# Patient Record
Sex: MALE | Race: WHITE | HISPANIC OR LATINO | URBAN - METROPOLITAN AREA
[De-identification: names, ages, dates, MRNs, and addresses within clinical notes are randomized per-mention and may not be internally consistent; named-entity substitution may affect disease eponyms.]

---

## 2016-05-25 RX ORDER — BICALUTAMIDE 50 MG/1
1 TABLET, FILM COATED ORAL
Qty: 30 | Refills: 0 | COMMUNITY
Start: 2016-05-25 | End: 2016-06-24

## 2017-02-05 ENCOUNTER — EMERGENCY (EMERGENCY)
Facility: HOSPITAL | Age: 82
LOS: 1 days | Discharge: HOME | End: 2017-02-05
Attending: EMERGENCY MEDICINE
Payer: MEDICARE

## 2017-02-05 VITALS
RESPIRATION RATE: 18 BRPM | HEART RATE: 62 BPM | DIASTOLIC BLOOD PRESSURE: 62 MMHG | TEMPERATURE: 98 F | SYSTOLIC BLOOD PRESSURE: 150 MMHG | OXYGEN SATURATION: 99 %

## 2017-02-05 VITALS
TEMPERATURE: 98 F | DIASTOLIC BLOOD PRESSURE: 101 MMHG | HEART RATE: 76 BPM | HEIGHT: 67 IN | RESPIRATION RATE: 19 BRPM | WEIGHT: 153 LBS | SYSTOLIC BLOOD PRESSURE: 185 MMHG | OXYGEN SATURATION: 95 %

## 2017-02-05 DIAGNOSIS — K21.9 GASTRO-ESOPHAGEAL REFLUX DISEASE WITHOUT ESOPHAGITIS: ICD-10-CM

## 2017-02-05 DIAGNOSIS — Z98.41 CATARACT EXTRACTION STATUS, RIGHT EYE: ICD-10-CM

## 2017-02-05 DIAGNOSIS — M79.604 PAIN IN RIGHT LEG: ICD-10-CM

## 2017-02-05 DIAGNOSIS — C79.51 SECONDARY MALIGNANT NEOPLASM OF BONE: ICD-10-CM

## 2017-02-05 DIAGNOSIS — Z88.0 ALLERGY STATUS TO PENICILLIN: ICD-10-CM

## 2017-02-05 DIAGNOSIS — Z96.1 PRESENCE OF INTRAOCULAR LENS: ICD-10-CM

## 2017-02-05 DIAGNOSIS — Z98.42 CATARACT EXTRACTION STATUS, LEFT EYE: ICD-10-CM

## 2017-02-05 DIAGNOSIS — Z92.21 PERSONAL HISTORY OF ANTINEOPLASTIC CHEMOTHERAPY: ICD-10-CM

## 2017-02-05 DIAGNOSIS — Z98.41 CATARACT EXTRACTION STATUS, RIGHT EYE: Chronic | ICD-10-CM

## 2017-02-05 DIAGNOSIS — C61 MALIGNANT NEOPLASM OF PROSTATE: ICD-10-CM

## 2017-02-05 DIAGNOSIS — M79.605 PAIN IN LEFT LEG: ICD-10-CM

## 2017-02-05 LAB
ALBUMIN SERPL ELPH-MCNC: 4 G/DL — SIGNIFICANT CHANGE UP (ref 3.5–5)
ALP SERPL-CCNC: 910 U/L — HIGH (ref 40–120)
ALT FLD-CCNC: 16 U/L DA — SIGNIFICANT CHANGE UP (ref 10–60)
ANION GAP SERPL CALC-SCNC: 9 MMOL/L — SIGNIFICANT CHANGE UP (ref 5–17)
APPEARANCE UR: CLEAR — SIGNIFICANT CHANGE UP
APTT BLD: 34.1 SEC — SIGNIFICANT CHANGE UP (ref 27.5–37.4)
AST SERPL-CCNC: 43 U/L — HIGH (ref 10–40)
BACTERIA # UR AUTO: ABNORMAL /HPF
BASOPHILS # BLD AUTO: 0 K/UL — SIGNIFICANT CHANGE UP (ref 0–0.2)
BASOPHILS NFR BLD AUTO: 0.6 % — SIGNIFICANT CHANGE UP (ref 0–2)
BILIRUB SERPL-MCNC: 0.5 MG/DL — SIGNIFICANT CHANGE UP (ref 0.2–1.2)
BILIRUB UR-MCNC: NEGATIVE — SIGNIFICANT CHANGE UP
BUN SERPL-MCNC: 17 MG/DL — SIGNIFICANT CHANGE UP (ref 7–18)
CALCIUM SERPL-MCNC: 8.5 MG/DL — SIGNIFICANT CHANGE UP (ref 8.4–10.5)
CHLORIDE SERPL-SCNC: 104 MMOL/L — SIGNIFICANT CHANGE UP (ref 96–108)
CK SERPL-CCNC: 270 U/L — HIGH (ref 35–232)
CO2 SERPL-SCNC: 28 MMOL/L — SIGNIFICANT CHANGE UP (ref 22–31)
COLOR SPEC: YELLOW — SIGNIFICANT CHANGE UP
CREAT SERPL-MCNC: 0.92 MG/DL — SIGNIFICANT CHANGE UP (ref 0.5–1.3)
DIFF PNL FLD: ABNORMAL
EOSINOPHIL # BLD AUTO: 0 K/UL — SIGNIFICANT CHANGE UP (ref 0–0.5)
EOSINOPHIL NFR BLD AUTO: 0.3 % — SIGNIFICANT CHANGE UP (ref 0–6)
EPI CELLS # UR: ABNORMAL (ref 0–10)
GLUCOSE SERPL-MCNC: 104 MG/DL — HIGH (ref 70–99)
GLUCOSE UR QL: NEGATIVE — SIGNIFICANT CHANGE UP
HCT VFR BLD CALC: 34.3 % — LOW (ref 39–50)
HGB BLD-MCNC: 11 G/DL — LOW (ref 13–17)
INR BLD: 1.22 RATIO — HIGH (ref 0.88–1.16)
KETONES UR-MCNC: NEGATIVE — SIGNIFICANT CHANGE UP
LEUKOCYTE ESTERASE UR-ACNC: NEGATIVE — SIGNIFICANT CHANGE UP
LYMPHOCYTES # BLD AUTO: 0.8 K/UL — LOW (ref 1–3.3)
LYMPHOCYTES # BLD AUTO: 20.4 % — SIGNIFICANT CHANGE UP (ref 13–44)
MCHC RBC-ENTMCNC: 30.2 PG — SIGNIFICANT CHANGE UP (ref 27–34)
MCHC RBC-ENTMCNC: 32 GM/DL — SIGNIFICANT CHANGE UP (ref 32–36)
MCV RBC AUTO: 94.1 FL — SIGNIFICANT CHANGE UP (ref 80–100)
MONOCYTES # BLD AUTO: 0.4 K/UL — SIGNIFICANT CHANGE UP (ref 0–0.9)
MONOCYTES NFR BLD AUTO: 9.6 % — SIGNIFICANT CHANGE UP (ref 2–14)
NEUTROPHILS # BLD AUTO: 2.6 K/UL — SIGNIFICANT CHANGE UP (ref 1.8–7.4)
NEUTROPHILS NFR BLD AUTO: 69.1 % — SIGNIFICANT CHANGE UP (ref 43–77)
NITRITE UR-MCNC: NEGATIVE — SIGNIFICANT CHANGE UP
PH UR: 7 — SIGNIFICANT CHANGE UP (ref 4.8–8)
PLATELET # BLD AUTO: 87 K/UL — LOW (ref 150–400)
POTASSIUM SERPL-MCNC: 4.3 MMOL/L — SIGNIFICANT CHANGE UP (ref 3.5–5.3)
POTASSIUM SERPL-SCNC: 4.3 MMOL/L — SIGNIFICANT CHANGE UP (ref 3.5–5.3)
PROT SERPL-MCNC: 7.1 G/DL — SIGNIFICANT CHANGE UP (ref 6–8.3)
PROT UR-MCNC: 30 MG/DL
PROTHROM AB SERPL-ACNC: 13.7 SEC — HIGH (ref 10–13.1)
RBC # BLD: 3.64 M/UL — LOW (ref 4.2–5.8)
RBC # FLD: 14 % — SIGNIFICANT CHANGE UP (ref 10.3–14.5)
RBC CASTS # UR COMP ASSIST: SIGNIFICANT CHANGE UP /HPF (ref 0–2)
SODIUM SERPL-SCNC: 141 MMOL/L — SIGNIFICANT CHANGE UP (ref 135–145)
SP GR SPEC: 1.01 — SIGNIFICANT CHANGE UP (ref 1.01–1.02)
UROBILINOGEN FLD QL: NEGATIVE — SIGNIFICANT CHANGE UP
WBC # BLD: 3.8 K/UL — SIGNIFICANT CHANGE UP (ref 3.8–10.5)
WBC # FLD AUTO: 3.8 K/UL — SIGNIFICANT CHANGE UP (ref 3.8–10.5)
WBC UR QL: SIGNIFICANT CHANGE UP /HPF (ref 0–5)

## 2017-02-05 PROCEDURE — 99285 EMERGENCY DEPT VISIT HI MDM: CPT

## 2017-02-05 PROCEDURE — 73521 X-RAY EXAM HIPS BI 2 VIEWS: CPT | Mod: 26

## 2017-02-05 PROCEDURE — 72131 CT LUMBAR SPINE W/O DYE: CPT | Mod: 26

## 2017-02-05 PROCEDURE — 82550 ASSAY OF CK (CPK): CPT

## 2017-02-05 PROCEDURE — 72131 CT LUMBAR SPINE W/O DYE: CPT

## 2017-02-05 PROCEDURE — 99284 EMERGENCY DEPT VISIT MOD MDM: CPT | Mod: 25

## 2017-02-05 PROCEDURE — 93970 EXTREMITY STUDY: CPT | Mod: 26

## 2017-02-05 PROCEDURE — 85027 COMPLETE CBC AUTOMATED: CPT

## 2017-02-05 PROCEDURE — 85610 PROTHROMBIN TIME: CPT

## 2017-02-05 PROCEDURE — 71010: CPT | Mod: 26

## 2017-02-05 PROCEDURE — 87086 URINE CULTURE/COLONY COUNT: CPT

## 2017-02-05 PROCEDURE — 71045 X-RAY EXAM CHEST 1 VIEW: CPT

## 2017-02-05 PROCEDURE — 81001 URINALYSIS AUTO W/SCOPE: CPT

## 2017-02-05 PROCEDURE — 80053 COMPREHEN METABOLIC PANEL: CPT

## 2017-02-05 PROCEDURE — 85730 THROMBOPLASTIN TIME PARTIAL: CPT

## 2017-02-05 PROCEDURE — 93970 EXTREMITY STUDY: CPT

## 2017-02-05 PROCEDURE — 73521 X-RAY EXAM HIPS BI 2 VIEWS: CPT

## 2017-02-05 RX ORDER — SODIUM CHLORIDE 9 MG/ML
1000 INJECTION INTRAMUSCULAR; INTRAVENOUS; SUBCUTANEOUS
Qty: 0 | Refills: 0 | Status: DISCONTINUED | OUTPATIENT
Start: 2017-02-05 | End: 2017-02-09

## 2017-02-05 RX ORDER — ACETAMINOPHEN 500 MG
650 TABLET ORAL ONCE
Qty: 0 | Refills: 0 | Status: COMPLETED | OUTPATIENT
Start: 2017-02-05 | End: 2017-02-05

## 2017-02-05 RX ADMIN — Medication 650 MILLIGRAM(S): at 10:28

## 2017-02-05 RX ADMIN — SODIUM CHLORIDE 125 MILLILITER(S): 9 INJECTION INTRAMUSCULAR; INTRAVENOUS; SUBCUTANEOUS at 10:28

## 2017-02-05 NOTE — ED PROVIDER NOTE - MUSCULOSKELETAL, MLM
Spine appears kyphotic, range of motion is not limited, no muscle or joint tenderness, no calf tenderness, no CVA tenderness, straight leg 90 degree

## 2017-02-05 NOTE — ED PROVIDER NOTE - OBJECTIVE STATEMENT
89 y/o M pt w/ PMHx of prostate cancer diagnosed May 2016, s/p chemo therapy presents to the ED c/o b/l anterior upper thigh pain since yesterday. Associates weakness secondary to symptoms/ Pt denies injuries, difficulty ambulating, back pain, edema, calf pains, fever, N/V/D, dysuria, coughing or any other complaints. NKDA.

## 2017-02-05 NOTE — ED PROVIDER NOTE - NS ED MD SCRIBE ATTENDING SCRIBE SECTIONS
VITAL SIGNS( Pullset)/HIV/DISPOSITION/PHYSICAL EXAM/HISTORY OF PRESENT ILLNESS/REVIEW OF SYSTEMS/PAST MEDICAL/SURGICAL/SOCIAL HISTORY

## 2017-02-05 NOTE — ED ADULT NURSE NOTE - PMH
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

## 2017-02-05 NOTE — ED PROVIDER NOTE - MEDICAL DECISION MAKING DETAILS
Pt w/ prostate CA now w/ b/l thigh pain. Concern for metastasis. Will get labs, CT, LS spine and hip X-ray.

## 2017-02-05 NOTE — ED ADULT NURSE NOTE - OBJECTIVE STATEMENT
Presented to ED with complaints of bilateral leg pain that started yesterday. States he "was not able to sleep last night." Patient with history of prostate cancer. AA&Ox3. Breathing on room air. Able to move all extremities.

## 2017-02-06 LAB
CULTURE RESULTS: NO GROWTH — SIGNIFICANT CHANGE UP
SPECIMEN SOURCE: SIGNIFICANT CHANGE UP

## 2017-06-11 RX ORDER — BICALUTAMIDE 50 MG/1
0 TABLET, FILM COATED ORAL
Qty: 90 | Refills: 0 | COMMUNITY
Start: 2017-06-11

## 2017-06-21 ENCOUNTER — INPATIENT (INPATIENT)
Facility: HOSPITAL | Age: 82
LOS: 0 days | Discharge: HOME | DRG: 812 | End: 2017-06-22
Attending: INTERNAL MEDICINE | Admitting: INTERNAL MEDICINE
Payer: COMMERCIAL

## 2017-06-21 VITALS
HEART RATE: 100 BPM | DIASTOLIC BLOOD PRESSURE: 61 MMHG | OXYGEN SATURATION: 98 % | SYSTOLIC BLOOD PRESSURE: 138 MMHG | TEMPERATURE: 98 F | HEIGHT: 67 IN | RESPIRATION RATE: 18 BRPM | WEIGHT: 145.06 LBS

## 2017-06-21 DIAGNOSIS — D64.9 ANEMIA, UNSPECIFIED: ICD-10-CM

## 2017-06-21 DIAGNOSIS — Z29.9 ENCOUNTER FOR PROPHYLACTIC MEASURES, UNSPECIFIED: ICD-10-CM

## 2017-06-21 DIAGNOSIS — Z98.41 CATARACT EXTRACTION STATUS, RIGHT EYE: Chronic | ICD-10-CM

## 2017-06-21 DIAGNOSIS — C61 MALIGNANT NEOPLASM OF PROSTATE: ICD-10-CM

## 2017-06-21 DIAGNOSIS — I48.91 UNSPECIFIED ATRIAL FIBRILLATION: ICD-10-CM

## 2017-06-21 LAB
ALBUMIN SERPL ELPH-MCNC: 3.1 G/DL — LOW (ref 3.5–5)
ALP SERPL-CCNC: 983 U/L — HIGH (ref 40–120)
ALT FLD-CCNC: 17 U/L DA — SIGNIFICANT CHANGE UP (ref 10–60)
ANION GAP SERPL CALC-SCNC: 8 MMOL/L — SIGNIFICANT CHANGE UP (ref 5–17)
APTT BLD: 28.6 SEC — SIGNIFICANT CHANGE UP (ref 27.5–37.4)
AST SERPL-CCNC: 54 U/L — HIGH (ref 10–40)
BASOPHILS # BLD AUTO: 0 K/UL — SIGNIFICANT CHANGE UP (ref 0–0.2)
BASOPHILS NFR BLD AUTO: 1.2 % — SIGNIFICANT CHANGE UP (ref 0–2)
BILIRUB SERPL-MCNC: 0.3 MG/DL — SIGNIFICANT CHANGE UP (ref 0.2–1.2)
BUN SERPL-MCNC: 24 MG/DL — HIGH (ref 7–18)
CALCIUM SERPL-MCNC: 8.1 MG/DL — LOW (ref 8.4–10.5)
CHLORIDE SERPL-SCNC: 106 MMOL/L — SIGNIFICANT CHANGE UP (ref 96–108)
CO2 SERPL-SCNC: 26 MMOL/L — SIGNIFICANT CHANGE UP (ref 22–31)
CREAT SERPL-MCNC: 1.13 MG/DL — SIGNIFICANT CHANGE UP (ref 0.5–1.3)
EOSINOPHIL # BLD AUTO: 0 K/UL — SIGNIFICANT CHANGE UP (ref 0–0.5)
EOSINOPHIL NFR BLD AUTO: 0.3 % — SIGNIFICANT CHANGE UP (ref 0–6)
GLUCOSE SERPL-MCNC: 128 MG/DL — HIGH (ref 70–99)
HCT VFR BLD CALC: 21.1 % — LOW (ref 39–50)
HGB BLD-MCNC: 6.8 G/DL — CRITICAL LOW (ref 13–17)
INR BLD: 1.21 RATIO — HIGH (ref 0.88–1.16)
LYMPHOCYTES # BLD AUTO: 0.6 K/UL — LOW (ref 1–3.3)
LYMPHOCYTES # BLD AUTO: 21.8 % — SIGNIFICANT CHANGE UP (ref 13–44)
MCHC RBC-ENTMCNC: 30.3 PG — SIGNIFICANT CHANGE UP (ref 27–34)
MCHC RBC-ENTMCNC: 32.5 GM/DL — SIGNIFICANT CHANGE UP (ref 32–36)
MCV RBC AUTO: 93.3 FL — SIGNIFICANT CHANGE UP (ref 80–100)
MONOCYTES # BLD AUTO: 0.3 K/UL — SIGNIFICANT CHANGE UP (ref 0–0.9)
MONOCYTES NFR BLD AUTO: 8.9 % — SIGNIFICANT CHANGE UP (ref 2–14)
NEUTROPHILS # BLD AUTO: 2 K/UL — SIGNIFICANT CHANGE UP (ref 1.8–7.4)
NEUTROPHILS NFR BLD AUTO: 67.8 % — SIGNIFICANT CHANGE UP (ref 43–77)
PLATELET # BLD AUTO: 72 K/UL — LOW (ref 150–400)
POTASSIUM SERPL-MCNC: 4.1 MMOL/L — SIGNIFICANT CHANGE UP (ref 3.5–5.3)
POTASSIUM SERPL-SCNC: 4.1 MMOL/L — SIGNIFICANT CHANGE UP (ref 3.5–5.3)
PROT SERPL-MCNC: 6.4 G/DL — SIGNIFICANT CHANGE UP (ref 6–8.3)
PROTHROM AB SERPL-ACNC: 13.2 SEC — HIGH (ref 9.8–12.7)
RBC # BLD: 2.26 M/UL — LOW (ref 4.2–5.8)
RBC # FLD: 16.5 % — HIGH (ref 10.3–14.5)
SODIUM SERPL-SCNC: 140 MMOL/L — SIGNIFICANT CHANGE UP (ref 135–145)
WBC # BLD: 3 K/UL — LOW (ref 3.8–10.5)
WBC # FLD AUTO: 3 K/UL — LOW (ref 3.8–10.5)

## 2017-06-21 PROCEDURE — 99285 EMERGENCY DEPT VISIT HI MDM: CPT

## 2017-06-21 RX ORDER — SODIUM CHLORIDE 9 MG/ML
1000 INJECTION INTRAMUSCULAR; INTRAVENOUS; SUBCUTANEOUS
Qty: 0 | Refills: 0 | Status: DISCONTINUED | OUTPATIENT
Start: 2017-06-21 | End: 2017-06-22

## 2017-06-21 RX ORDER — SODIUM CHLORIDE 9 MG/ML
3 INJECTION INTRAMUSCULAR; INTRAVENOUS; SUBCUTANEOUS ONCE
Qty: 0 | Refills: 0 | Status: COMPLETED | OUTPATIENT
Start: 2017-06-21 | End: 2017-06-21

## 2017-06-21 RX ORDER — ACETAMINOPHEN 500 MG
650 TABLET ORAL ONCE
Qty: 0 | Refills: 0 | Status: COMPLETED | OUTPATIENT
Start: 2017-06-21 | End: 2017-06-21

## 2017-06-21 RX ORDER — FOLIC ACID 0.8 MG
1 TABLET ORAL DAILY
Qty: 0 | Refills: 0 | Status: DISCONTINUED | OUTPATIENT
Start: 2017-06-21 | End: 2017-06-22

## 2017-06-21 RX ORDER — METOPROLOL TARTRATE 50 MG
12.5 TABLET ORAL ONCE
Qty: 0 | Refills: 0 | Status: COMPLETED | OUTPATIENT
Start: 2017-06-21 | End: 2017-06-21

## 2017-06-21 RX ADMIN — SODIUM CHLORIDE 60 MILLILITER(S): 9 INJECTION INTRAMUSCULAR; INTRAVENOUS; SUBCUTANEOUS at 20:24

## 2017-06-21 RX ADMIN — Medication 650 MILLIGRAM(S): at 22:20

## 2017-06-21 RX ADMIN — Medication 1 MILLIGRAM(S): at 15:59

## 2017-06-21 RX ADMIN — Medication 1 TABLET(S): at 15:59

## 2017-06-21 RX ADMIN — SODIUM CHLORIDE 3 MILLILITER(S): 9 INJECTION INTRAMUSCULAR; INTRAVENOUS; SUBCUTANEOUS at 13:55

## 2017-06-21 RX ADMIN — SODIUM CHLORIDE 60 MILLILITER(S): 9 INJECTION INTRAMUSCULAR; INTRAVENOUS; SUBCUTANEOUS at 16:01

## 2017-06-21 NOTE — H&P ADULT - NEGATIVE SKIN SYMPTOMS
no hair loss/no rash/no pitted nails/no dryness/no itching/no tumor/no change in size/color of mole/no brittle nails

## 2017-06-21 NOTE — CHART NOTE - NSCHARTNOTEFT_GEN_A_CORE
Paged by RN regarding patient refusing 2 U PRBC/    Patient seen and examined at bedside. After speaking to nurse manager, patient now agreeing to 2nd unit PRBC.    ~. Refuses BP meds as says he has not needed pills at home and feels fine. Also refuses CXR.    Discussed with patient that in case his BP goes up 2/2 PRBC that we may have to give BP meds. Patient expressed understanding.    Will check CBC now (post 1st U PRBC) and then check CBC AM (post 2nd U PRBC).    Discussed plan with RN.

## 2017-06-21 NOTE — ED PROVIDER NOTE - MEDICAL DECISION MAKING DETAILS
89 y/o M pt w/ shortness of breath and weakness sent in by PMD for low HGB. Will draw labs, guaiac and admit. 89 y/o M pt w/ shortness of breath and weakness sent in by PMD for low HGB. Will draw labs, guaiac and admit to Dr. Navarro.

## 2017-06-21 NOTE — H&P ADULT - NEGATIVE CARDIOVASCULAR SYMPTOMS
no palpitations/no dyspnea on exertion/no chest pain/no orthopnea/no claudication/no peripheral edema

## 2017-06-21 NOTE — H&P ADULT - NEGATIVE NEUROLOGICAL SYMPTOMS
no transient paralysis/no difficulty walking/no vertigo/no headache/no loss of sensation/no tremors/no facial palsy/no focal seizures/no paresthesias/no confusion/no weakness/no loss of consciousness

## 2017-06-21 NOTE — H&P ADULT - HISTORY OF PRESENT ILLNESS
89 year old male from home ambulates with cane  no HHA independant in ADL .past medical history of metastatic prostate cancer with mets to the bone and liver  afib ( not on a/c or rate control meds)only on po chemothereapy taking  Bicalutamide TID  was sent in to the ER by PCP for low hemoglobin . patient states he was in his usual state of health 1 month ago , but now he is feeling increased lethary and weakness , he has PORTER and fatigue . his symptoms were progressivley getting worse , he went to see his PCP Dr Navarro yesterday where they did some blood test , today he was called that his hemoglobin was 7.0 , his last h/h one year ago was 10.0 . he also called his hemeoncologist Dr Bowman , who agreed with the plan of care about him going to the ER , patient denies in blood in stools , black stools , hematuria , abdominal pain or any other symptoms .Denies fever, chills or any other complaints. .ROS is negative except above.    In the ER patient's VS T 98.3 ,  , bp 138/61 , RR 18 PULSE OX of 98 , patient's labs pertient for h/h 6.8/21.1 , akp phos 983 , AST 54 .ekg afib (per pcp afib is old ). patient refused guiac . 2 units PRBC  were ordered.    GOC : patient wishes to be DNR/DNI  ( consent in chart)

## 2017-06-21 NOTE — H&P ADULT - NEGATIVE GENERAL GENITOURINARY SYMPTOMS
normal urinary frequency/no urinary hesitancy/no nocturia/normal libido/no urine discoloration/no gas in urine/no bladder infections/no hematuria

## 2017-06-21 NOTE — ED ADULT NURSE NOTE - OBJECTIVE STATEMENT
axox3 forgetful at times ambulates with assistance sent by PCP due to low H/H denies any discomfort at present time

## 2017-06-21 NOTE — H&P ADULT - NEGATIVE PSYCHIATRIC SYMPTOMS
no paranoia/no mood swings/no suicidal ideation/no auditory hallucinations/no memory loss/no depression/no anxiety/no agitation/no visual hallucinations/no hyperactivity

## 2017-06-21 NOTE — H&P ADULT - NEGATIVE ENMT SYMPTOMS
no tinnitus/no ear pain/no abnormal taste sensation/no dysphagia/no post-nasal discharge/no nose bleeds/no nasal discharge/no hearing difficulty/no throat pain/no sinus symptoms/no nasal obstruction/no gum bleeding/no recurrent cold sores/no dry mouth

## 2017-06-21 NOTE — H&P ADULT - PROBLEM SELECTOR PLAN 1
symptomatic anemia: h/h 6.8/21.1   baseline of 10.0 last year  patient vs stable  refused guiac  getting 2 units PRBC  likley side effect of Bicalutamide with super imposed malignancy   f/u guiac when patient has BM  Dr Colby red onc consult ( follows patient as outaptent)   cbc q12

## 2017-06-21 NOTE — H&P ADULT - NEGATIVE GASTROINTESTINAL SYMPTOMS
no vomiting/no melena/no steatorrhea/no hematochezia/no jaundice/no nausea/no change in bowel habits/no diarrhea/no flatulence/no abdominal pain

## 2017-06-21 NOTE — H&P ADULT - NEGATIVE OPHTHALMOLOGIC SYMPTOMS
no pain R/no diplopia/no discharge L/no photophobia/no lacrimation R/no lacrimation L/no irritation L

## 2017-06-21 NOTE — H&P ADULT - ASSESSMENT
89 year old male from home ambulates with cane  no HHA independant in ADL .past medical history of metastatic prostate cancer with mets to the bone and liver  afib ( not on a/c or rate control meds)only on po chemothereapy taking  Bicalutamide TID  was sent in to the ER by PCP for low hemoglobin  is being admitted for symptomatic anemia requiring blood transfusion.

## 2017-06-21 NOTE — ED PROVIDER NOTE - OBJECTIVE STATEMENT
89 y/o M pt w/ PMHx of GERD, metastatic prostate CA to the bone and cataract of L eye presents to the ED c/o low HGB. Pt felt weakness w/ shortness of breath; pt went to PMD who sent pt to the ED for low HGB. Denies fever, chills or any other complaints. NKDA. 89 y/o M pt w/ PMHx of GERD, metastatic prostate CA to the bone and cataract of L eye presents to the ED c/o low HGB. Pt felt weakness w/ shortness of breath; pt went to PMD who sent pt to the ED for low HGB. Denies fever, chills or any other complaints. NKDA. Pt on chemo medication Bicalutamide.

## 2017-06-22 VITALS
OXYGEN SATURATION: 98 % | SYSTOLIC BLOOD PRESSURE: 143 MMHG | DIASTOLIC BLOOD PRESSURE: 73 MMHG | RESPIRATION RATE: 16 BRPM | HEART RATE: 77 BPM | TEMPERATURE: 98 F

## 2017-06-22 LAB
24R-OH-CALCIDIOL SERPL-MCNC: 23.5 NG/ML — LOW (ref 30–100)
ALBUMIN SERPL ELPH-MCNC: 2.9 G/DL — LOW (ref 3.5–5)
ALP SERPL-CCNC: 939 U/L — HIGH (ref 40–120)
ALT FLD-CCNC: 17 U/L DA — SIGNIFICANT CHANGE UP (ref 10–60)
ANION GAP SERPL CALC-SCNC: 7 MMOL/L — SIGNIFICANT CHANGE UP (ref 5–17)
AST SERPL-CCNC: 51 U/L — HIGH (ref 10–40)
BASOPHILS # BLD AUTO: 0 K/UL — SIGNIFICANT CHANGE UP (ref 0–0.2)
BASOPHILS NFR BLD AUTO: 1.1 % — SIGNIFICANT CHANGE UP (ref 0–2)
BILIRUB SERPL-MCNC: 0.8 MG/DL — SIGNIFICANT CHANGE UP (ref 0.2–1.2)
BUN SERPL-MCNC: 19 MG/DL — HIGH (ref 7–18)
CALCIUM SERPL-MCNC: 8 MG/DL — LOW (ref 8.4–10.5)
CHLORIDE SERPL-SCNC: 106 MMOL/L — SIGNIFICANT CHANGE UP (ref 96–108)
CHOLEST SERPL-MCNC: 189 MG/DL — SIGNIFICANT CHANGE UP (ref 10–199)
CO2 SERPL-SCNC: 25 MMOL/L — SIGNIFICANT CHANGE UP (ref 22–31)
CREAT SERPL-MCNC: 0.96 MG/DL — SIGNIFICANT CHANGE UP (ref 0.5–1.3)
EOSINOPHIL # BLD AUTO: 0 K/UL — SIGNIFICANT CHANGE UP (ref 0–0.5)
EOSINOPHIL NFR BLD AUTO: 0.1 % — SIGNIFICANT CHANGE UP (ref 0–6)
FOLATE SERPL-MCNC: >20 NG/ML — SIGNIFICANT CHANGE UP (ref 4.8–24.2)
GLUCOSE SERPL-MCNC: 105 MG/DL — HIGH (ref 70–99)
HCT VFR BLD CALC: 24.2 % — LOW (ref 39–50)
HCT VFR BLD CALC: 27.2 % — LOW (ref 39–50)
HDLC SERPL-MCNC: 55 MG/DL — SIGNIFICANT CHANGE UP (ref 40–125)
HGB BLD-MCNC: 8.1 G/DL — LOW (ref 13–17)
HGB BLD-MCNC: 9.3 G/DL — LOW (ref 13–17)
LIPID PNL WITH DIRECT LDL SERPL: 119 MG/DL — SIGNIFICANT CHANGE UP
LYMPHOCYTES # BLD AUTO: 1 K/UL — SIGNIFICANT CHANGE UP (ref 1–3.3)
LYMPHOCYTES # BLD AUTO: 30.1 % — SIGNIFICANT CHANGE UP (ref 13–44)
LYMPHOCYTES # BLD AUTO: 34 % — SIGNIFICANT CHANGE UP (ref 13–44)
MAGNESIUM SERPL-MCNC: 2.2 MG/DL — SIGNIFICANT CHANGE UP (ref 1.6–2.6)
MCHC RBC-ENTMCNC: 31.2 PG — SIGNIFICANT CHANGE UP (ref 27–34)
MCHC RBC-ENTMCNC: 31.2 PG — SIGNIFICANT CHANGE UP (ref 27–34)
MCHC RBC-ENTMCNC: 33.3 GM/DL — SIGNIFICANT CHANGE UP (ref 32–36)
MCHC RBC-ENTMCNC: 34.4 GM/DL — SIGNIFICANT CHANGE UP (ref 32–36)
MCV RBC AUTO: 90.9 FL — SIGNIFICANT CHANGE UP (ref 80–100)
MCV RBC AUTO: 93.6 FL — SIGNIFICANT CHANGE UP (ref 80–100)
MONOCYTES # BLD AUTO: 0.3 K/UL — SIGNIFICANT CHANGE UP (ref 0–0.9)
MONOCYTES NFR BLD AUTO: 2 % — SIGNIFICANT CHANGE UP (ref 2–14)
MONOCYTES NFR BLD AUTO: 9.2 % — SIGNIFICANT CHANGE UP (ref 2–14)
NEUTROPHILS # BLD AUTO: 1.9 K/UL — SIGNIFICANT CHANGE UP (ref 1.8–7.4)
NEUTROPHILS NFR BLD AUTO: 59.5 % — SIGNIFICANT CHANGE UP (ref 43–77)
NEUTROPHILS NFR BLD AUTO: 62 % — SIGNIFICANT CHANGE UP (ref 43–77)
PHOSPHATE SERPL-MCNC: 2.8 MG/DL — SIGNIFICANT CHANGE UP (ref 2.5–4.5)
PLATELET # BLD AUTO: 50 K/UL — LOW (ref 150–400)
PLATELET # BLD AUTO: 55 K/UL — LOW (ref 150–400)
POTASSIUM SERPL-MCNC: 4.1 MMOL/L — SIGNIFICANT CHANGE UP (ref 3.5–5.3)
POTASSIUM SERPL-SCNC: 4.1 MMOL/L — SIGNIFICANT CHANGE UP (ref 3.5–5.3)
PROT SERPL-MCNC: 6.2 G/DL — SIGNIFICANT CHANGE UP (ref 6–8.3)
RBC # BLD: 2.59 M/UL — LOW (ref 4.2–5.8)
RBC # BLD: 2.99 M/UL — LOW (ref 4.2–5.8)
RBC # FLD: 15.6 % — HIGH (ref 10.3–14.5)
RBC # FLD: 15.7 % — HIGH (ref 10.3–14.5)
SODIUM SERPL-SCNC: 138 MMOL/L — SIGNIFICANT CHANGE UP (ref 135–145)
TOTAL CHOLESTEROL/HDL RATIO MEASUREMENT: 3.4 RATIO — SIGNIFICANT CHANGE UP (ref 3.4–9.6)
TRIGL SERPL-MCNC: 77 MG/DL — SIGNIFICANT CHANGE UP (ref 10–149)
TSH SERPL-MCNC: 1.85 UU/ML — SIGNIFICANT CHANGE UP (ref 0.34–4.82)
VIT B12 SERPL-MCNC: >2000 PG/ML — HIGH (ref 243–894)
WBC # BLD: 2.9 K/UL — LOW (ref 3.8–10.5)
WBC # BLD: 3.2 K/UL — LOW (ref 3.8–10.5)
WBC # FLD AUTO: 2.9 K/UL — LOW (ref 3.8–10.5)
WBC # FLD AUTO: 3.2 K/UL — LOW (ref 3.8–10.5)

## 2017-06-22 PROCEDURE — 84443 ASSAY THYROID STIM HORMONE: CPT

## 2017-06-22 PROCEDURE — 85730 THROMBOPLASTIN TIME PARTIAL: CPT

## 2017-06-22 PROCEDURE — P9040: CPT

## 2017-06-22 PROCEDURE — 83735 ASSAY OF MAGNESIUM: CPT

## 2017-06-22 PROCEDURE — 86901 BLOOD TYPING SEROLOGIC RH(D): CPT

## 2017-06-22 PROCEDURE — 85027 COMPLETE CBC AUTOMATED: CPT

## 2017-06-22 PROCEDURE — 84100 ASSAY OF PHOSPHORUS: CPT

## 2017-06-22 PROCEDURE — 80053 COMPREHEN METABOLIC PANEL: CPT

## 2017-06-22 PROCEDURE — 86900 BLOOD TYPING SEROLOGIC ABO: CPT

## 2017-06-22 PROCEDURE — 71045 X-RAY EXAM CHEST 1 VIEW: CPT

## 2017-06-22 PROCEDURE — 36415 COLL VENOUS BLD VENIPUNCTURE: CPT

## 2017-06-22 PROCEDURE — 99285 EMERGENCY DEPT VISIT HI MDM: CPT | Mod: 25

## 2017-06-22 PROCEDURE — 82746 ASSAY OF FOLIC ACID SERUM: CPT

## 2017-06-22 PROCEDURE — 82607 VITAMIN B-12: CPT

## 2017-06-22 PROCEDURE — 36430 TRANSFUSION BLD/BLD COMPNT: CPT

## 2017-06-22 PROCEDURE — 93005 ELECTROCARDIOGRAM TRACING: CPT

## 2017-06-22 PROCEDURE — 80061 LIPID PANEL: CPT

## 2017-06-22 PROCEDURE — 86920 COMPATIBILITY TEST SPIN: CPT

## 2017-06-22 PROCEDURE — 86850 RBC ANTIBODY SCREEN: CPT

## 2017-06-22 PROCEDURE — 82306 VITAMIN D 25 HYDROXY: CPT

## 2017-06-22 PROCEDURE — 85610 PROTHROMBIN TIME: CPT

## 2017-06-22 RX ORDER — ACETAMINOPHEN 500 MG
650 TABLET ORAL ONCE
Qty: 0 | Refills: 0 | Status: COMPLETED | OUTPATIENT
Start: 2017-06-22 | End: 2017-06-22

## 2017-06-22 RX ADMIN — Medication 650 MILLIGRAM(S): at 06:24

## 2017-06-22 RX ADMIN — Medication 1 MILLIGRAM(S): at 13:02

## 2017-06-22 RX ADMIN — Medication 1 TABLET(S): at 13:02

## 2017-06-22 RX ADMIN — Medication 650 MILLIGRAM(S): at 06:54

## 2017-06-22 NOTE — DISCHARGE NOTE ADULT - MEDICATION SUMMARY - MEDICATIONS TO CHANGE
I will SWITCH the dose or number of times a day I take the medications listed below when I get home from the hospital:    BICALUTAMIDE 50 MG TABLET

## 2017-06-22 NOTE — DISCHARGE NOTE ADULT - HOSPITAL COURSE
89 year old male from home ambulates with cane  no HHA independant in ADL .past medical history of metastatic prostate cancer with mets to the bone and liver  afib ( not on a/c or rate control meds)only on po chemothereapy taking  Bicalutamide TID  was sent in to the ER by PCP for low hemoglobin  is being admitted for symptomatic anemia requiring blood transfusion.    #Anemia: symptomatic anemia: h/h 6.8/21.1   baseline of 10.0 last year  refused guiac  s/p 2 units PRBC and H/h improved to 9.3/27.2  Dr Bowman heme onc consult ( follows patient as outaptent)     #Prostate cancer metastatic to multiple sites: on  Bicalutamide   f/.u Dr Bowman as outpatient    Patient is medically stable and ready to DC after discussed with the attending.

## 2017-06-22 NOTE — DISCHARGE NOTE ADULT - MEDICATION SUMMARY - MEDICATIONS TO TAKE
I will START or STAY ON the medications listed below when I get home from the hospital:    oxyCODONE-acetaminophen 5mg-325mg oral tablet  -- 1/2 tab(s) by mouth 2 times a day, As Needed MDD:1  -- Caution federal law prohibits the transfer of this drug to any person other  than the person for whom it was prescribed.  May cause drowsiness.  Alcohol may intensify this effect.  Use care when operating dangerous machinery.  This prescription cannot be refilled.  This product contains acetaminophen.  Do not use  with any other product containing acetaminophen to prevent possible liver damage.  Using more of this medication than prescribed may cause serious breathing problems.    -- Indication: For Pain med    bicalutamide 50 mg oral tablet  -- 1 tab(s) by mouth 3 times a day  -- Indication: For Prostate cancer metastatic to multiple sites    multivitamin with iron  -- 1 tab(s) by mouth once a day  -- Indication: For supplement    folic acid 1 mg oral tablet  -- 1 tab(s) by mouth once a day  -- Indication: For supplement

## 2017-06-22 NOTE — DISCHARGE NOTE ADULT - CARE PLAN
Principal Discharge DX:	Anemia  Goal:	to prevent from worsening  Instructions for follow-up, activity and diet:	symptomatic anemia: h/h 6.8/21.1   refused guiac  s/p 2 units PRBC and H/h improved to 9.3/27.2  f/up CBC at PMD's office in a week.  Secondary Diagnosis:	Prostate cancer metastatic to multiple sites  Goal:	f/up with   Instructions for follow-up, activity and diet:	continue med and f/up with  in a week.

## 2017-06-22 NOTE — DISCHARGE NOTE ADULT - CARE PROVIDER_API CALL
Brenda Bowman), Internal Medicine; Medical Oncology  [address] [place] [address]  Phone: [phone]  Fax: [phone]    César Clemente), Internal Medicine  [facility] [place] [address]  Phone: [phone]  Fax: [phone]

## 2017-06-22 NOTE — PROGRESS NOTE ADULT - SUBJECTIVE AND OBJECTIVE BOX
Patient seen ion office on 6/20 for severe weakness. Found very pale and hypotensive. Refused Hospital. Known Advanced Prostate Cancer. Lbs showed Hgb 6.9 g/dl, agree to come for blood transfusion.    VS Stable  Pale.  Cor: NSRS4, 2/6 CHRISTINA.  Abdomen soft.  Neuro A&O x3.    Labs: Hgb < 7.0  Elevated Alkalin Phiosphatase due to Bone Mets.

## 2017-06-22 NOTE — DISCHARGE NOTE ADULT - PATIENT PORTAL LINK FT
“You can access the [facility] Patient Portal, offered by [facility], by registering with the following website: [email or web address]”

## 2017-06-22 NOTE — DISCHARGE NOTE ADULT - PLAN OF CARE
to prevent from worsening symptomatic anemia: h/h 6.8/21.1   refused guiac  s/p 2 units PRBC and H/h improved to 9.3/27.2  f/up CBC at PMD's office in a week. f/up with  continue med and f/up with  in a week.

## 2017-06-29 DIAGNOSIS — H26.9 UNSPECIFIED CATARACT: ICD-10-CM
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
## 2017-06-29 DIAGNOSIS — K21.9 GASTRO-ESOPHAGEAL REFLUX DISEASE WITHOUT ESOPHAGITIS: ICD-10-CM

## 2017-06-29 DIAGNOSIS — C78.7 SECONDARY MALIGNANT NEOPLASM OF LIVER AND INTRAHEPATIC BILE DUCT: ICD-10-CM

## 2017-06-29 DIAGNOSIS — C79.51 SECONDARY MALIGNANT NEOPLASM OF BONE: ICD-10-CM

## 2017-06-29 DIAGNOSIS — I48.91 UNSPECIFIED ATRIAL FIBRILLATION: ICD-10-CM

## 2017-06-29 DIAGNOSIS — Z66 DO NOT RESUSCITATE: ICD-10-CM

## 2017-06-29 DIAGNOSIS — C61 MALIGNANT NEOPLASM OF PROSTATE: ICD-10-CM

## 2017-06-29 DIAGNOSIS — D64.81 ANEMIA DUE TO ANTINEOPLASTIC CHEMOTHERAPY: ICD-10-CM

## 2017-06-29 DIAGNOSIS — T38.6X5A ADVERSE EFFECT OF ANTIGONADOTROPHINS, ANTIESTROGENS, ANTIANDROGENS, NOT ELSEWHERE CLASSIFIED, INITIAL ENCOUNTER: ICD-10-CM

## 2017-06-29 DIAGNOSIS — Z88.0 ALLERGY STATUS TO PENICILLIN: ICD-10-CM

## 2017-06-29 DIAGNOSIS — Z88.6 ALLERGY STATUS TO ANALGESIC AGENT: ICD-10-CM

## 2018-01-01 ENCOUNTER — INPATIENT (INPATIENT)
Facility: HOSPITAL | Age: 83
LOS: 6 days | Discharge: NURSING, REHAB OR LONG-TERM CARE | DRG: 55 | End: 2018-05-29
Attending: INTERNAL MEDICINE | Admitting: INTERNAL MEDICINE
Payer: MEDICARE

## 2018-01-01 ENCOUNTER — INPATIENT (INPATIENT)
Facility: HOSPITAL | Age: 83
LOS: 0 days | DRG: 871 | End: 2018-10-10
Attending: INTERNAL MEDICINE | Admitting: INTERNAL MEDICINE
Payer: MEDICARE

## 2018-01-01 ENCOUNTER — INPATIENT (INPATIENT)
Facility: HOSPITAL | Age: 83
LOS: 2 days | Discharge: NURSING, REHAB OR LONG-TERM CARE | DRG: 812 | End: 2018-06-12
Attending: INTERNAL MEDICINE | Admitting: INTERNAL MEDICINE
Payer: MEDICARE

## 2018-01-01 ENCOUNTER — INPATIENT (INPATIENT)
Facility: HOSPITAL | Age: 83
LOS: 1 days | Discharge: HOME | DRG: 436 | End: 2018-05-18
Attending: INTERNAL MEDICINE | Admitting: INTERNAL MEDICINE
Payer: MEDICARE

## 2018-01-01 VITALS
SYSTOLIC BLOOD PRESSURE: 125 MMHG | DIASTOLIC BLOOD PRESSURE: 61 MMHG | OXYGEN SATURATION: 99 % | WEIGHT: 119.93 LBS | RESPIRATION RATE: 18 BRPM | HEART RATE: 75 BPM | TEMPERATURE: 98 F | HEIGHT: 67 IN

## 2018-01-01 VITALS
RESPIRATION RATE: 20 BRPM | HEART RATE: 92 BPM | OXYGEN SATURATION: 100 % | WEIGHT: 110.01 LBS | SYSTOLIC BLOOD PRESSURE: 61 MMHG | DIASTOLIC BLOOD PRESSURE: 32 MMHG | HEIGHT: 62 IN

## 2018-01-01 VITALS
TEMPERATURE: 98 F | RESPIRATION RATE: 18 BRPM | SYSTOLIC BLOOD PRESSURE: 130 MMHG | WEIGHT: 110.67 LBS | DIASTOLIC BLOOD PRESSURE: 76 MMHG | HEART RATE: 74 BPM | OXYGEN SATURATION: 95 %

## 2018-01-01 VITALS
RESPIRATION RATE: 16 BRPM | HEIGHT: 67 IN | SYSTOLIC BLOOD PRESSURE: 106 MMHG | TEMPERATURE: 98 F | OXYGEN SATURATION: 97 % | WEIGHT: 130.07 LBS | HEART RATE: 98 BPM | DIASTOLIC BLOOD PRESSURE: 66 MMHG

## 2018-01-01 VITALS
OXYGEN SATURATION: 98 % | HEART RATE: 114 BPM | HEIGHT: 68 IN | SYSTOLIC BLOOD PRESSURE: 150 MMHG | WEIGHT: 149.91 LBS | DIASTOLIC BLOOD PRESSURE: 90 MMHG | RESPIRATION RATE: 20 BRPM

## 2018-01-01 VITALS
RESPIRATION RATE: 20 BRPM | HEART RATE: 74 BPM | OXYGEN SATURATION: 100 % | SYSTOLIC BLOOD PRESSURE: 151 MMHG | TEMPERATURE: 97 F | DIASTOLIC BLOOD PRESSURE: 77 MMHG

## 2018-01-01 VITALS
TEMPERATURE: 97 F | DIASTOLIC BLOOD PRESSURE: 67 MMHG | HEART RATE: 69 BPM | OXYGEN SATURATION: 100 % | RESPIRATION RATE: 17 BRPM | SYSTOLIC BLOOD PRESSURE: 145 MMHG

## 2018-01-01 VITALS — DIASTOLIC BLOOD PRESSURE: 64 MMHG | SYSTOLIC BLOOD PRESSURE: 87 MMHG | TEMPERATURE: 97 F

## 2018-01-01 DIAGNOSIS — Z29.9 ENCOUNTER FOR PROPHYLACTIC MEASURES, UNSPECIFIED: ICD-10-CM

## 2018-01-01 DIAGNOSIS — D69.6 THROMBOCYTOPENIA, UNSPECIFIED: ICD-10-CM

## 2018-01-01 DIAGNOSIS — R53.81 OTHER MALAISE: ICD-10-CM

## 2018-01-01 DIAGNOSIS — D64.9 ANEMIA, UNSPECIFIED: ICD-10-CM

## 2018-01-01 DIAGNOSIS — R52 PAIN, UNSPECIFIED: ICD-10-CM

## 2018-01-01 DIAGNOSIS — C79.31 SECONDARY MALIGNANT NEOPLASM OF BRAIN: ICD-10-CM

## 2018-01-01 DIAGNOSIS — A41.9 SEPSIS, UNSPECIFIED ORGANISM: ICD-10-CM

## 2018-01-01 DIAGNOSIS — E44.0 MODERATE PROTEIN-CALORIE MALNUTRITION: ICD-10-CM

## 2018-01-01 DIAGNOSIS — Z51.5 ENCOUNTER FOR PALLIATIVE CARE: ICD-10-CM

## 2018-01-01 DIAGNOSIS — R06.02 SHORTNESS OF BREATH: ICD-10-CM

## 2018-01-01 DIAGNOSIS — Z98.41 CATARACT EXTRACTION STATUS, RIGHT EYE: Chronic | ICD-10-CM

## 2018-01-01 DIAGNOSIS — R53.1 WEAKNESS: ICD-10-CM

## 2018-01-01 DIAGNOSIS — I95.9 HYPOTENSION, UNSPECIFIED: ICD-10-CM

## 2018-01-01 DIAGNOSIS — R41.82 ALTERED MENTAL STATUS, UNSPECIFIED: ICD-10-CM

## 2018-01-01 DIAGNOSIS — C61 MALIGNANT NEOPLASM OF PROSTATE: ICD-10-CM

## 2018-01-01 DIAGNOSIS — K21.9 GASTRO-ESOPHAGEAL REFLUX DISEASE WITHOUT ESOPHAGITIS: ICD-10-CM

## 2018-01-01 DIAGNOSIS — I48.91 UNSPECIFIED ATRIAL FIBRILLATION: ICD-10-CM

## 2018-01-01 DIAGNOSIS — G89.3 NEOPLASM RELATED PAIN (ACUTE) (CHRONIC): ICD-10-CM

## 2018-01-01 DIAGNOSIS — Z71.89 OTHER SPECIFIED COUNSELING: ICD-10-CM

## 2018-01-01 DIAGNOSIS — D63.0 ANEMIA IN NEOPLASTIC DISEASE: ICD-10-CM

## 2018-01-01 LAB
ACETONE SERPL-MCNC: NEGATIVE — SIGNIFICANT CHANGE UP
ALBUMIN SERPL ELPH-MCNC: 2.4 G/DL — LOW (ref 3.5–5)
ALBUMIN SERPL ELPH-MCNC: 2.5 G/DL — LOW (ref 3.5–5)
ALBUMIN SERPL ELPH-MCNC: 2.6 G/DL — LOW (ref 3.5–5)
ALP SERPL-CCNC: 248 U/L — HIGH (ref 40–120)
ALP SERPL-CCNC: 400 U/L — HIGH (ref 40–120)
ALP SERPL-CCNC: 501 U/L — HIGH (ref 40–120)
ALP SERPL-CCNC: 572 U/L — HIGH (ref 40–120)
ALP SERPL-CCNC: 585 U/L — HIGH (ref 40–120)
ALT FLD-CCNC: 14 U/L DA — SIGNIFICANT CHANGE UP (ref 10–60)
ALT FLD-CCNC: 18 U/L DA — SIGNIFICANT CHANGE UP (ref 10–60)
ALT FLD-CCNC: 20 U/L DA — SIGNIFICANT CHANGE UP (ref 10–60)
ALT FLD-CCNC: 39 U/L DA — SIGNIFICANT CHANGE UP (ref 10–60)
ALT FLD-CCNC: 40 U/L DA — SIGNIFICANT CHANGE UP (ref 10–60)
ANION GAP SERPL CALC-SCNC: 12 MMOL/L — SIGNIFICANT CHANGE UP (ref 5–17)
ANION GAP SERPL CALC-SCNC: 13 MMOL/L — SIGNIFICANT CHANGE UP (ref 5–17)
ANION GAP SERPL CALC-SCNC: 13 MMOL/L — SIGNIFICANT CHANGE UP (ref 5–17)
ANION GAP SERPL CALC-SCNC: 14 MMOL/L — SIGNIFICANT CHANGE UP (ref 5–17)
ANION GAP SERPL CALC-SCNC: 21 MMOL/L — HIGH (ref 5–17)
ANION GAP SERPL CALC-SCNC: 8 MMOL/L — SIGNIFICANT CHANGE UP (ref 5–17)
ANION GAP SERPL CALC-SCNC: 9 MMOL/L — SIGNIFICANT CHANGE UP (ref 5–17)
ANION GAP SERPL CALC-SCNC: 9 MMOL/L — SIGNIFICANT CHANGE UP (ref 5–17)
ANISOCYTOSIS BLD QL: SLIGHT — SIGNIFICANT CHANGE UP
APPEARANCE UR: CLEAR — SIGNIFICANT CHANGE UP
APTT BLD: 22.7 SEC — LOW (ref 27.5–37.4)
APTT BLD: 32.3 SEC — SIGNIFICANT CHANGE UP (ref 27.5–37.4)
APTT BLD: 35.2 SEC — SIGNIFICANT CHANGE UP (ref 27.5–37.4)
AST SERPL-CCNC: 310 U/L — HIGH (ref 10–40)
AST SERPL-CCNC: 32 U/L — SIGNIFICANT CHANGE UP (ref 10–40)
AST SERPL-CCNC: 53 U/L — HIGH (ref 10–40)
AST SERPL-CCNC: 60 U/L — HIGH (ref 10–40)
AST SERPL-CCNC: 80 U/L — HIGH (ref 10–40)
BACTERIA # UR AUTO: ABNORMAL /HPF
BASO STIPL BLD QL SMEAR: PRESENT — SIGNIFICANT CHANGE UP
BASOPHILS # BLD AUTO: 0 K/UL — SIGNIFICANT CHANGE UP (ref 0–0.2)
BASOPHILS # BLD AUTO: 0.1 K/UL — SIGNIFICANT CHANGE UP (ref 0–0.2)
BASOPHILS # BLD AUTO: 0.1 K/UL — SIGNIFICANT CHANGE UP (ref 0–0.2)
BASOPHILS NFR BLD AUTO: 0.4 % — SIGNIFICANT CHANGE UP (ref 0–2)
BASOPHILS NFR BLD AUTO: 0.6 % — SIGNIFICANT CHANGE UP (ref 0–2)
BASOPHILS NFR BLD AUTO: 0.6 % — SIGNIFICANT CHANGE UP (ref 0–2)
BASOPHILS NFR BLD AUTO: 0.9 % — SIGNIFICANT CHANGE UP (ref 0–2)
BASOPHILS NFR BLD AUTO: 0.9 % — SIGNIFICANT CHANGE UP (ref 0–2)
BASOPHILS NFR BLD AUTO: 1 % — SIGNIFICANT CHANGE UP (ref 0–2)
BASOPHILS NFR BLD AUTO: 1.2 % — SIGNIFICANT CHANGE UP (ref 0–2)
BILIRUB SERPL-MCNC: 0.4 MG/DL — SIGNIFICANT CHANGE UP (ref 0.2–1.2)
BILIRUB SERPL-MCNC: 0.4 MG/DL — SIGNIFICANT CHANGE UP (ref 0.2–1.2)
BILIRUB SERPL-MCNC: 0.6 MG/DL — SIGNIFICANT CHANGE UP (ref 0.2–1.2)
BILIRUB SERPL-MCNC: 0.8 MG/DL — SIGNIFICANT CHANGE UP (ref 0.2–1.2)
BILIRUB SERPL-MCNC: 2.4 MG/DL — HIGH (ref 0.2–1.2)
BILIRUB UR-MCNC: NEGATIVE — SIGNIFICANT CHANGE UP
BLD GP AB SCN SERPL QL: SIGNIFICANT CHANGE UP
BLD GP AB SCN SERPL QL: SIGNIFICANT CHANGE UP
BUN SERPL-MCNC: 18 MG/DL — SIGNIFICANT CHANGE UP (ref 7–18)
BUN SERPL-MCNC: 19 MG/DL — HIGH (ref 7–18)
BUN SERPL-MCNC: 19 MG/DL — HIGH (ref 7–18)
BUN SERPL-MCNC: 22 MG/DL — HIGH (ref 7–18)
BUN SERPL-MCNC: 28 MG/DL — HIGH (ref 7–18)
BUN SERPL-MCNC: 30 MG/DL — HIGH (ref 7–18)
BUN SERPL-MCNC: 35 MG/DL — HIGH (ref 7–18)
BUN SERPL-MCNC: 36 MG/DL — HIGH (ref 7–18)
BUN SERPL-MCNC: 55 MG/DL — HIGH (ref 7–18)
BUN SERPL-MCNC: 55 MG/DL — HIGH (ref 7–18)
BUN SERPL-MCNC: 59 MG/DL — HIGH (ref 7–18)
CALCIUM SERPL-MCNC: 7.6 MG/DL — LOW (ref 8.4–10.5)
CALCIUM SERPL-MCNC: 7.7 MG/DL — LOW (ref 8.4–10.5)
CALCIUM SERPL-MCNC: 7.8 MG/DL — LOW (ref 8.4–10.5)
CALCIUM SERPL-MCNC: 7.8 MG/DL — LOW (ref 8.4–10.5)
CALCIUM SERPL-MCNC: 7.9 MG/DL — LOW (ref 8.4–10.5)
CALCIUM SERPL-MCNC: 7.9 MG/DL — LOW (ref 8.4–10.5)
CALCIUM SERPL-MCNC: 8.1 MG/DL — LOW (ref 8.4–10.5)
CALCIUM SERPL-MCNC: 8.1 MG/DL — LOW (ref 8.4–10.5)
CALCIUM SERPL-MCNC: 8.2 MG/DL — LOW (ref 8.4–10.5)
CALCIUM SERPL-MCNC: 8.3 MG/DL — LOW (ref 8.4–10.5)
CALCIUM SERPL-MCNC: 8.4 MG/DL — SIGNIFICANT CHANGE UP (ref 8.4–10.5)
CHLORIDE SERPL-SCNC: 105 MMOL/L — SIGNIFICANT CHANGE UP (ref 96–108)
CHLORIDE SERPL-SCNC: 106 MMOL/L — SIGNIFICANT CHANGE UP (ref 96–108)
CHLORIDE SERPL-SCNC: 107 MMOL/L — SIGNIFICANT CHANGE UP (ref 96–108)
CHLORIDE SERPL-SCNC: 108 MMOL/L — SIGNIFICANT CHANGE UP (ref 96–108)
CHLORIDE SERPL-SCNC: 109 MMOL/L — HIGH (ref 96–108)
CHLORIDE SERPL-SCNC: 111 MMOL/L — HIGH (ref 96–108)
CHLORIDE SERPL-SCNC: 111 MMOL/L — HIGH (ref 96–108)
CHOLEST SERPL-MCNC: 158 MG/DL — SIGNIFICANT CHANGE UP (ref 10–199)
CHOLEST SERPL-MCNC: 180 MG/DL — SIGNIFICANT CHANGE UP (ref 10–199)
CK MB BLD-MCNC: <0.2 % — SIGNIFICANT CHANGE UP (ref 0–3.5)
CK MB CFR SERPL CALC: <1 NG/ML — SIGNIFICANT CHANGE UP (ref 0–3.6)
CK SERPL-CCNC: 328 U/L — HIGH (ref 35–232)
CK SERPL-CCNC: 471 U/L — HIGH (ref 35–232)
CO2 SERPL-SCNC: 13 MMOL/L — LOW (ref 22–31)
CO2 SERPL-SCNC: 20 MMOL/L — LOW (ref 22–31)
CO2 SERPL-SCNC: 20 MMOL/L — LOW (ref 22–31)
CO2 SERPL-SCNC: 21 MMOL/L — LOW (ref 22–31)
CO2 SERPL-SCNC: 21 MMOL/L — LOW (ref 22–31)
CO2 SERPL-SCNC: 23 MMOL/L — SIGNIFICANT CHANGE UP (ref 22–31)
CO2 SERPL-SCNC: 24 MMOL/L — SIGNIFICANT CHANGE UP (ref 22–31)
COLOR SPEC: YELLOW — SIGNIFICANT CHANGE UP
CREAT SERPL-MCNC: 0.6 MG/DL — SIGNIFICANT CHANGE UP (ref 0.5–1.3)
CREAT SERPL-MCNC: 0.73 MG/DL — SIGNIFICANT CHANGE UP (ref 0.5–1.3)
CREAT SERPL-MCNC: 0.76 MG/DL — SIGNIFICANT CHANGE UP (ref 0.5–1.3)
CREAT SERPL-MCNC: 0.89 MG/DL — SIGNIFICANT CHANGE UP (ref 0.5–1.3)
CREAT SERPL-MCNC: 0.9 MG/DL — SIGNIFICANT CHANGE UP (ref 0.5–1.3)
CREAT SERPL-MCNC: 0.9 MG/DL — SIGNIFICANT CHANGE UP (ref 0.5–1.3)
CREAT SERPL-MCNC: 0.93 MG/DL — SIGNIFICANT CHANGE UP (ref 0.5–1.3)
CREAT SERPL-MCNC: 1.02 MG/DL — SIGNIFICANT CHANGE UP (ref 0.5–1.3)
CREAT SERPL-MCNC: 1.11 MG/DL — SIGNIFICANT CHANGE UP (ref 0.5–1.3)
CREAT SERPL-MCNC: 1.14 MG/DL — SIGNIFICANT CHANGE UP (ref 0.5–1.3)
CREAT SERPL-MCNC: 1.59 MG/DL — HIGH (ref 0.5–1.3)
CULTURE RESULTS: NO GROWTH — SIGNIFICANT CHANGE UP
CULTURE RESULTS: SIGNIFICANT CHANGE UP
DACRYOCYTES BLD QL SMEAR: SLIGHT — SIGNIFICANT CHANGE UP
DIFF PNL FLD: ABNORMAL
DIFF PNL FLD: NEGATIVE — SIGNIFICANT CHANGE UP
EOSINOPHIL # BLD AUTO: 0 K/UL — SIGNIFICANT CHANGE UP (ref 0–0.5)
EOSINOPHIL NFR BLD AUTO: 0 % — SIGNIFICANT CHANGE UP (ref 0–6)
EOSINOPHIL NFR BLD AUTO: 0 % — SIGNIFICANT CHANGE UP (ref 0–6)
EOSINOPHIL NFR BLD AUTO: 0.1 % — SIGNIFICANT CHANGE UP (ref 0–6)
EOSINOPHIL NFR BLD AUTO: 0.3 % — SIGNIFICANT CHANGE UP (ref 0–6)
EPI CELLS # UR: ABNORMAL /HPF
FERRITIN SERPL-MCNC: 4544 NG/ML — HIGH (ref 30–400)
FERRITIN SERPL-MCNC: 6068 NG/ML — HIGH (ref 30–400)
FOLATE SERPL-MCNC: 15.6 NG/ML — SIGNIFICANT CHANGE UP
GLUCOSE SERPL-MCNC: 114 MG/DL — HIGH (ref 70–99)
GLUCOSE SERPL-MCNC: 119 MG/DL — HIGH (ref 70–99)
GLUCOSE SERPL-MCNC: 140 MG/DL — HIGH (ref 70–99)
GLUCOSE SERPL-MCNC: 163 MG/DL — HIGH (ref 70–99)
GLUCOSE SERPL-MCNC: 72 MG/DL — SIGNIFICANT CHANGE UP (ref 70–99)
GLUCOSE SERPL-MCNC: 78 MG/DL — SIGNIFICANT CHANGE UP (ref 70–99)
GLUCOSE SERPL-MCNC: 82 MG/DL — SIGNIFICANT CHANGE UP (ref 70–99)
GLUCOSE SERPL-MCNC: 83 MG/DL — SIGNIFICANT CHANGE UP (ref 70–99)
GLUCOSE SERPL-MCNC: 89 MG/DL — SIGNIFICANT CHANGE UP (ref 70–99)
GLUCOSE SERPL-MCNC: 94 MG/DL — SIGNIFICANT CHANGE UP (ref 70–99)
GLUCOSE SERPL-MCNC: 95 MG/DL — SIGNIFICANT CHANGE UP (ref 70–99)
GLUCOSE UR QL: NEGATIVE — SIGNIFICANT CHANGE UP
HAPTOGLOB SERPL-MCNC: 107 MG/DL — SIGNIFICANT CHANGE UP (ref 34–200)
HBA1C BLD-MCNC: 5.6 % — SIGNIFICANT CHANGE UP (ref 4–5.6)
HBA1C BLD-MCNC: 5.8 % — HIGH (ref 4–5.6)
HCT VFR BLD CALC: 17.2 % — CRITICAL LOW (ref 39–50)
HCT VFR BLD CALC: 18.3 % — CRITICAL LOW (ref 39–50)
HCT VFR BLD CALC: 22.8 % — LOW (ref 39–50)
HCT VFR BLD CALC: 26.8 % — LOW (ref 39–50)
HCT VFR BLD CALC: 27.6 % — LOW (ref 39–50)
HCT VFR BLD CALC: 27.8 % — LOW (ref 39–50)
HCT VFR BLD CALC: 28.7 % — LOW (ref 39–50)
HCT VFR BLD CALC: 29.1 % — LOW (ref 39–50)
HCT VFR BLD CALC: 29.5 % — LOW (ref 39–50)
HCT VFR BLD CALC: 29.7 % — LOW (ref 39–50)
HCT VFR BLD CALC: 30.7 % — LOW (ref 39–50)
HCT VFR BLD CALC: 31.6 % — LOW (ref 39–50)
HCT VFR BLD CALC: 32.4 % — LOW (ref 39–50)
HDLC SERPL-MCNC: 51 MG/DL — SIGNIFICANT CHANGE UP (ref 40–125)
HDLC SERPL-MCNC: 65 MG/DL — SIGNIFICANT CHANGE UP
HGB BLD-MCNC: 10.1 G/DL — LOW (ref 13–17)
HGB BLD-MCNC: 10.1 G/DL — LOW (ref 13–17)
HGB BLD-MCNC: 5.1 G/DL — CRITICAL LOW (ref 13–17)
HGB BLD-MCNC: 5.5 G/DL — CRITICAL LOW (ref 13–17)
HGB BLD-MCNC: 6.6 G/DL — CRITICAL LOW (ref 13–17)
HGB BLD-MCNC: 8.5 G/DL — LOW (ref 13–17)
HGB BLD-MCNC: 8.7 G/DL — LOW (ref 13–17)
HGB BLD-MCNC: 8.7 G/DL — LOW (ref 13–17)
HGB BLD-MCNC: 8.9 G/DL — LOW (ref 13–17)
HGB BLD-MCNC: 9 G/DL — LOW (ref 13–17)
HGB BLD-MCNC: 9.1 G/DL — LOW (ref 13–17)
HGB BLD-MCNC: 9.1 G/DL — LOW (ref 13–17)
HGB BLD-MCNC: 9.2 G/DL — LOW (ref 13–17)
INR BLD: 1.2 RATIO — HIGH (ref 0.88–1.16)
INR BLD: 1.48 RATIO — HIGH (ref 0.88–1.16)
INR BLD: 1.55 RATIO — HIGH (ref 0.88–1.16)
IRON SATN MFR SERPL: 232 UG/DL — HIGH (ref 65–170)
IRON SATN MFR SERPL: 44 % — SIGNIFICANT CHANGE UP (ref 20–55)
IRON SATN MFR SERPL: 62 UG/DL — LOW (ref 65–170)
IRON SATN MFR SERPL: 93 % — HIGH (ref 20–55)
KETONES UR-MCNC: NEGATIVE — SIGNIFICANT CHANGE UP
LACTATE SERPL-SCNC: 1.9 MMOL/L — SIGNIFICANT CHANGE UP (ref 0.7–2)
LACTATE SERPL-SCNC: 11.3 MMOL/L — CRITICAL HIGH (ref 0.7–2)
LACTATE SERPL-SCNC: 4.5 MMOL/L — CRITICAL HIGH (ref 0.7–2)
LACTATE SERPL-SCNC: 5.3 MMOL/L — CRITICAL HIGH (ref 0.7–2)
LDH SERPL L TO P-CCNC: 638 U/L — HIGH (ref 120–225)
LEUKOCYTE ESTERASE UR-ACNC: NEGATIVE — SIGNIFICANT CHANGE UP
LIDOCAIN IGE QN: 53 U/L — LOW (ref 73–393)
LIPID PNL WITH DIRECT LDL SERPL: 69 MG/DL — SIGNIFICANT CHANGE UP
LIPID PNL WITH DIRECT LDL SERPL: 90 MG/DL — SIGNIFICANT CHANGE UP
LYMPHOCYTES # BLD AUTO: 0.8 K/UL — LOW (ref 1–3.3)
LYMPHOCYTES # BLD AUTO: 1 K/UL — SIGNIFICANT CHANGE UP (ref 1–3.3)
LYMPHOCYTES # BLD AUTO: 1 K/UL — SIGNIFICANT CHANGE UP (ref 1–3.3)
LYMPHOCYTES # BLD AUTO: 1.1 K/UL — SIGNIFICANT CHANGE UP (ref 1–3.3)
LYMPHOCYTES # BLD AUTO: 1.2 K/UL — SIGNIFICANT CHANGE UP (ref 1–3.3)
LYMPHOCYTES # BLD AUTO: 1.4 K/UL — SIGNIFICANT CHANGE UP (ref 1–3.3)
LYMPHOCYTES # BLD AUTO: 2.6 K/UL — SIGNIFICANT CHANGE UP (ref 1–3.3)
LYMPHOCYTES # BLD AUTO: 20.5 % — SIGNIFICANT CHANGE UP (ref 13–44)
LYMPHOCYTES # BLD AUTO: 25 % — SIGNIFICANT CHANGE UP (ref 13–44)
LYMPHOCYTES # BLD AUTO: 26.1 % — SIGNIFICANT CHANGE UP (ref 13–44)
LYMPHOCYTES # BLD AUTO: 27.5 % — SIGNIFICANT CHANGE UP (ref 13–44)
LYMPHOCYTES # BLD AUTO: 27.5 % — SIGNIFICANT CHANGE UP (ref 13–44)
LYMPHOCYTES # BLD AUTO: 28.8 % — SIGNIFICANT CHANGE UP (ref 13–44)
LYMPHOCYTES # BLD AUTO: 35.3 % — SIGNIFICANT CHANGE UP (ref 13–44)
LYMPHOCYTES # BLD AUTO: 35.9 % — SIGNIFICANT CHANGE UP (ref 13–44)
MACROCYTES BLD QL: SIGNIFICANT CHANGE UP
MAGNESIUM SERPL-MCNC: 2.3 MG/DL — SIGNIFICANT CHANGE UP (ref 1.6–2.6)
MAGNESIUM SERPL-MCNC: 2.3 MG/DL — SIGNIFICANT CHANGE UP (ref 1.6–2.6)
MAGNESIUM SERPL-MCNC: 2.5 MG/DL — SIGNIFICANT CHANGE UP (ref 1.6–2.6)
MAGNESIUM SERPL-MCNC: 2.6 MG/DL — SIGNIFICANT CHANGE UP (ref 1.6–2.6)
MCHC RBC-ENTMCNC: 28.1 PG — SIGNIFICANT CHANGE UP (ref 27–34)
MCHC RBC-ENTMCNC: 28.8 PG — SIGNIFICANT CHANGE UP (ref 27–34)
MCHC RBC-ENTMCNC: 28.9 PG — SIGNIFICANT CHANGE UP (ref 27–34)
MCHC RBC-ENTMCNC: 29 GM/DL — LOW (ref 32–36)
MCHC RBC-ENTMCNC: 29.3 PG — SIGNIFICANT CHANGE UP (ref 27–34)
MCHC RBC-ENTMCNC: 29.4 PG — SIGNIFICANT CHANGE UP (ref 27–34)
MCHC RBC-ENTMCNC: 29.5 PG — SIGNIFICANT CHANGE UP (ref 27–34)
MCHC RBC-ENTMCNC: 29.6 PG — SIGNIFICANT CHANGE UP (ref 27–34)
MCHC RBC-ENTMCNC: 29.6 PG — SIGNIFICANT CHANGE UP (ref 27–34)
MCHC RBC-ENTMCNC: 29.7 GM/DL — LOW (ref 32–36)
MCHC RBC-ENTMCNC: 29.8 PG — SIGNIFICANT CHANGE UP (ref 27–34)
MCHC RBC-ENTMCNC: 29.9 GM/DL — LOW (ref 32–36)
MCHC RBC-ENTMCNC: 30 GM/DL — LOW (ref 32–36)
MCHC RBC-ENTMCNC: 30.2 PG — SIGNIFICANT CHANGE UP (ref 27–34)
MCHC RBC-ENTMCNC: 30.4 GM/DL — LOW (ref 32–36)
MCHC RBC-ENTMCNC: 30.4 GM/DL — LOW (ref 32–36)
MCHC RBC-ENTMCNC: 30.5 GM/DL — LOW (ref 32–36)
MCHC RBC-ENTMCNC: 30.8 GM/DL — LOW (ref 32–36)
MCHC RBC-ENTMCNC: 31 GM/DL — LOW (ref 32–36)
MCHC RBC-ENTMCNC: 31.1 GM/DL — LOW (ref 32–36)
MCHC RBC-ENTMCNC: 31.6 GM/DL — LOW (ref 32–36)
MCHC RBC-ENTMCNC: 32 GM/DL — SIGNIFICANT CHANGE UP (ref 32–36)
MCHC RBC-ENTMCNC: 33.1 PG — SIGNIFICANT CHANGE UP (ref 27–34)
MCHC RBC-ENTMCNC: 33.3 PG — SIGNIFICANT CHANGE UP (ref 27–34)
MCHC RBC-ENTMCNC: 33.7 GM/DL — SIGNIFICANT CHANGE UP (ref 32–36)
MCHC RBC-ENTMCNC: 33.9 PG — SIGNIFICANT CHANGE UP (ref 27–34)
MCV RBC AUTO: 110.9 FL — HIGH (ref 80–100)
MCV RBC AUTO: 113.1 FL — HIGH (ref 80–100)
MCV RBC AUTO: 93.6 FL — SIGNIFICANT CHANGE UP (ref 80–100)
MCV RBC AUTO: 94.2 FL — SIGNIFICANT CHANGE UP (ref 80–100)
MCV RBC AUTO: 94.4 FL — SIGNIFICANT CHANGE UP (ref 80–100)
MCV RBC AUTO: 95.3 FL — SIGNIFICANT CHANGE UP (ref 80–100)
MCV RBC AUTO: 95.4 FL — SIGNIFICANT CHANGE UP (ref 80–100)
MCV RBC AUTO: 96.3 FL — SIGNIFICANT CHANGE UP (ref 80–100)
MCV RBC AUTO: 96.3 FL — SIGNIFICANT CHANGE UP (ref 80–100)
MCV RBC AUTO: 96.7 FL — SIGNIFICANT CHANGE UP (ref 80–100)
MCV RBC AUTO: 96.8 FL — SIGNIFICANT CHANGE UP (ref 80–100)
MCV RBC AUTO: 97.2 FL — SIGNIFICANT CHANGE UP (ref 80–100)
MCV RBC AUTO: 98.2 FL — SIGNIFICANT CHANGE UP (ref 80–100)
MONOCYTES # BLD AUTO: 0.2 K/UL — SIGNIFICANT CHANGE UP (ref 0–0.9)
MONOCYTES # BLD AUTO: 0.2 K/UL — SIGNIFICANT CHANGE UP (ref 0–0.9)
MONOCYTES # BLD AUTO: 0.3 K/UL — SIGNIFICANT CHANGE UP (ref 0–0.9)
MONOCYTES # BLD AUTO: 0.4 K/UL — SIGNIFICANT CHANGE UP (ref 0–0.9)
MONOCYTES NFR BLD AUTO: 4.2 % — SIGNIFICANT CHANGE UP (ref 2–14)
MONOCYTES NFR BLD AUTO: 4.7 % — SIGNIFICANT CHANGE UP (ref 2–14)
MONOCYTES NFR BLD AUTO: 5 % — SIGNIFICANT CHANGE UP (ref 2–14)
MONOCYTES NFR BLD AUTO: 5.1 % — SIGNIFICANT CHANGE UP (ref 2–14)
MONOCYTES NFR BLD AUTO: 6.1 % — SIGNIFICANT CHANGE UP (ref 2–14)
MONOCYTES NFR BLD AUTO: 6.8 % — SIGNIFICANT CHANGE UP (ref 2–14)
MONOCYTES NFR BLD AUTO: 7.6 % — SIGNIFICANT CHANGE UP (ref 2–14)
MONOCYTES NFR BLD AUTO: 7.9 % — SIGNIFICANT CHANGE UP (ref 2–14)
NEUTROPHILS # BLD AUTO: 2.2 K/UL — SIGNIFICANT CHANGE UP (ref 1.8–7.4)
NEUTROPHILS # BLD AUTO: 2.3 K/UL — SIGNIFICANT CHANGE UP (ref 1.8–7.4)
NEUTROPHILS # BLD AUTO: 2.4 K/UL — SIGNIFICANT CHANGE UP (ref 1.8–7.4)
NEUTROPHILS # BLD AUTO: 2.7 K/UL — SIGNIFICANT CHANGE UP (ref 1.8–7.4)
NEUTROPHILS # BLD AUTO: 2.8 K/UL — SIGNIFICANT CHANGE UP (ref 1.8–7.4)
NEUTROPHILS # BLD AUTO: 2.9 K/UL — SIGNIFICANT CHANGE UP (ref 1.8–7.4)
NEUTROPHILS # BLD AUTO: 4.4 K/UL — SIGNIFICANT CHANGE UP (ref 1.8–7.4)
NEUTROPHILS NFR BLD AUTO: 56 % — SIGNIFICANT CHANGE UP (ref 43–77)
NEUTROPHILS NFR BLD AUTO: 58.9 % — SIGNIFICANT CHANGE UP (ref 43–77)
NEUTROPHILS NFR BLD AUTO: 65.2 % — SIGNIFICANT CHANGE UP (ref 43–77)
NEUTROPHILS NFR BLD AUTO: 65.2 % — SIGNIFICANT CHANGE UP (ref 43–77)
NEUTROPHILS NFR BLD AUTO: 65.4 % — SIGNIFICANT CHANGE UP (ref 43–77)
NEUTROPHILS NFR BLD AUTO: 67.9 % — SIGNIFICANT CHANGE UP (ref 43–77)
NEUTROPHILS NFR BLD AUTO: 70 % — SIGNIFICANT CHANGE UP (ref 43–77)
NEUTROPHILS NFR BLD AUTO: 70.9 % — SIGNIFICANT CHANGE UP (ref 43–77)
NITRITE UR-MCNC: NEGATIVE — SIGNIFICANT CHANGE UP
NT-PROBNP SERPL-SCNC: 3182 PG/ML — HIGH (ref 0–450)
NT-PROBNP SERPL-SCNC: HIGH PG/ML (ref 0–450)
OB PNL STL: NEGATIVE — SIGNIFICANT CHANGE UP
PH UR: 5 — SIGNIFICANT CHANGE UP (ref 5–8)
PH UR: 5 — SIGNIFICANT CHANGE UP (ref 5–8)
PH UR: 6 — SIGNIFICANT CHANGE UP (ref 5–8)
PH UR: 7 — SIGNIFICANT CHANGE UP (ref 5–8)
PHOSPHATE SERPL-MCNC: 2.5 MG/DL — SIGNIFICANT CHANGE UP (ref 2.5–4.5)
PHOSPHATE SERPL-MCNC: 3.9 MG/DL — SIGNIFICANT CHANGE UP (ref 2.5–4.5)
PHOSPHATE SERPL-MCNC: 6.3 MG/DL — HIGH (ref 2.5–4.5)
PLAT MORPH BLD: NORMAL — SIGNIFICANT CHANGE UP
PLATELET # BLD AUTO: 112 K/UL — LOW (ref 150–400)
PLATELET # BLD AUTO: 17 K/UL — CRITICAL LOW (ref 150–400)
PLATELET # BLD AUTO: 23 K/UL — LOW (ref 150–400)
PLATELET # BLD AUTO: 27 K/UL — LOW (ref 150–400)
PLATELET # BLD AUTO: 39 K/UL — LOW (ref 150–400)
PLATELET # BLD AUTO: 54 K/UL — LOW (ref 150–400)
PLATELET # BLD AUTO: 67 K/UL — LOW (ref 150–400)
PLATELET # BLD AUTO: 67 K/UL — LOW (ref 150–400)
PLATELET # BLD AUTO: 84 K/UL — LOW (ref 150–400)
PLATELET # BLD AUTO: 84 K/UL — LOW (ref 150–400)
PLATELET # BLD AUTO: 94 K/UL — LOW (ref 150–400)
PLATELET # BLD AUTO: 96 K/UL — LOW (ref 150–400)
PLATELET # BLD AUTO: 97 K/UL — LOW (ref 150–400)
POIKILOCYTOSIS BLD QL AUTO: SLIGHT — SIGNIFICANT CHANGE UP
POLYCHROMASIA BLD QL SMEAR: SLIGHT — SIGNIFICANT CHANGE UP
POTASSIUM SERPL-MCNC: 3 MMOL/L — LOW (ref 3.5–5.3)
POTASSIUM SERPL-MCNC: 3.4 MMOL/L — LOW (ref 3.5–5.3)
POTASSIUM SERPL-MCNC: 3.8 MMOL/L — SIGNIFICANT CHANGE UP (ref 3.5–5.3)
POTASSIUM SERPL-MCNC: 3.9 MMOL/L — SIGNIFICANT CHANGE UP (ref 3.5–5.3)
POTASSIUM SERPL-MCNC: 3.9 MMOL/L — SIGNIFICANT CHANGE UP (ref 3.5–5.3)
POTASSIUM SERPL-MCNC: 4 MMOL/L — SIGNIFICANT CHANGE UP (ref 3.5–5.3)
POTASSIUM SERPL-MCNC: 4 MMOL/L — SIGNIFICANT CHANGE UP (ref 3.5–5.3)
POTASSIUM SERPL-MCNC: 4.1 MMOL/L — SIGNIFICANT CHANGE UP (ref 3.5–5.3)
POTASSIUM SERPL-MCNC: 4.2 MMOL/L — SIGNIFICANT CHANGE UP (ref 3.5–5.3)
POTASSIUM SERPL-MCNC: 4.4 MMOL/L — SIGNIFICANT CHANGE UP (ref 3.5–5.3)
POTASSIUM SERPL-MCNC: 6 MMOL/L — HIGH (ref 3.5–5.3)
POTASSIUM SERPL-SCNC: 3 MMOL/L — LOW (ref 3.5–5.3)
POTASSIUM SERPL-SCNC: 3.4 MMOL/L — LOW (ref 3.5–5.3)
POTASSIUM SERPL-SCNC: 3.8 MMOL/L — SIGNIFICANT CHANGE UP (ref 3.5–5.3)
POTASSIUM SERPL-SCNC: 3.9 MMOL/L — SIGNIFICANT CHANGE UP (ref 3.5–5.3)
POTASSIUM SERPL-SCNC: 3.9 MMOL/L — SIGNIFICANT CHANGE UP (ref 3.5–5.3)
POTASSIUM SERPL-SCNC: 4 MMOL/L — SIGNIFICANT CHANGE UP (ref 3.5–5.3)
POTASSIUM SERPL-SCNC: 4 MMOL/L — SIGNIFICANT CHANGE UP (ref 3.5–5.3)
POTASSIUM SERPL-SCNC: 4.1 MMOL/L — SIGNIFICANT CHANGE UP (ref 3.5–5.3)
POTASSIUM SERPL-SCNC: 4.2 MMOL/L — SIGNIFICANT CHANGE UP (ref 3.5–5.3)
POTASSIUM SERPL-SCNC: 4.4 MMOL/L — SIGNIFICANT CHANGE UP (ref 3.5–5.3)
POTASSIUM SERPL-SCNC: 6 MMOL/L — HIGH (ref 3.5–5.3)
PROT SERPL-MCNC: 5.3 G/DL — LOW (ref 6–8.3)
PROT SERPL-MCNC: 5.4 G/DL — LOW (ref 6–8.3)
PROT SERPL-MCNC: 5.7 G/DL — LOW (ref 6–8.3)
PROT SERPL-MCNC: 6 G/DL — SIGNIFICANT CHANGE UP (ref 6–8.3)
PROT SERPL-MCNC: 6.4 G/DL — SIGNIFICANT CHANGE UP (ref 6–8.3)
PROT UR-MCNC: 15
PROT UR-MCNC: 15
PROT UR-MCNC: 30 MG/DL
PROT UR-MCNC: NEGATIVE — SIGNIFICANT CHANGE UP
PROTHROM AB SERPL-ACNC: 13.1 SEC — HIGH (ref 9.8–12.7)
PROTHROM AB SERPL-ACNC: 16.3 SEC — HIGH (ref 9.8–12.7)
PROTHROM AB SERPL-ACNC: 17 SEC — HIGH (ref 9.8–12.7)
RBC # BLD: 1.45 M/UL — LOW (ref 4.2–5.8)
RBC # BLD: 1.52 M/UL — LOW (ref 4.2–5.8)
RBC # BLD: 1.65 M/UL — LOW (ref 4.2–5.8)
RBC # BLD: 2.36 M/UL — LOW (ref 4.2–5.8)
RBC # BLD: 2.73 M/UL — LOW (ref 4.2–5.8)
RBC # BLD: 2.87 M/UL — LOW (ref 4.2–5.8)
RBC # BLD: 2.93 M/UL — LOW (ref 4.2–5.8)
RBC # BLD: 2.95 M/UL — LOW (ref 4.2–5.8)
RBC # BLD: 2.98 M/UL — LOW (ref 4.2–5.8)
RBC # BLD: 3.03 M/UL — LOW (ref 4.2–5.8)
RBC # BLD: 3.11 M/UL — LOW (ref 4.2–5.8)
RBC # BLD: 3.15 M/UL — LOW (ref 4.2–5.8)
RBC # BLD: 3.16 M/UL — LOW (ref 4.2–5.8)
RBC # BLD: 3.34 M/UL — LOW (ref 4.2–5.8)
RBC # BLD: 3.4 M/UL — LOW (ref 4.2–5.8)
RBC # FLD: 17.6 % — HIGH (ref 10.3–14.5)
RBC # FLD: 17.8 % — HIGH (ref 10.3–14.5)
RBC # FLD: 18 % — HIGH (ref 10.3–14.5)
RBC # FLD: 18.1 % — HIGH (ref 10.3–14.5)
RBC # FLD: 18.5 % — HIGH (ref 10.3–14.5)
RBC # FLD: 18.5 % — HIGH (ref 10.3–14.5)
RBC # FLD: 18.6 % — HIGH (ref 10.3–14.5)
RBC # FLD: 18.8 % — HIGH (ref 10.3–14.5)
RBC # FLD: 19.2 % — HIGH (ref 10.3–14.5)
RBC # FLD: 19.6 % — HIGH (ref 10.3–14.5)
RBC # FLD: 22.7 % — HIGH (ref 10.3–14.5)
RBC BLD AUTO: ABNORMAL
RBC CASTS # UR COMP ASSIST: ABNORMAL /HPF (ref 0–2)
RETICS #: 58.7 K/UL — SIGNIFICANT CHANGE UP (ref 25–125)
RETICS #: 66.7 K/UL — SIGNIFICANT CHANGE UP (ref 25–125)
RETICS/RBC NFR: 2 % — SIGNIFICANT CHANGE UP (ref 0.5–2.5)
RETICS/RBC NFR: 4.6 % — HIGH (ref 0.5–2.5)
SODIUM SERPL-SCNC: 137 MMOL/L — SIGNIFICANT CHANGE UP (ref 135–145)
SODIUM SERPL-SCNC: 138 MMOL/L — SIGNIFICANT CHANGE UP (ref 135–145)
SODIUM SERPL-SCNC: 139 MMOL/L — SIGNIFICANT CHANGE UP (ref 135–145)
SODIUM SERPL-SCNC: 139 MMOL/L — SIGNIFICANT CHANGE UP (ref 135–145)
SODIUM SERPL-SCNC: 140 MMOL/L — SIGNIFICANT CHANGE UP (ref 135–145)
SODIUM SERPL-SCNC: 140 MMOL/L — SIGNIFICANT CHANGE UP (ref 135–145)
SODIUM SERPL-SCNC: 141 MMOL/L — SIGNIFICANT CHANGE UP (ref 135–145)
SODIUM SERPL-SCNC: 141 MMOL/L — SIGNIFICANT CHANGE UP (ref 135–145)
SODIUM SERPL-SCNC: 142 MMOL/L — SIGNIFICANT CHANGE UP (ref 135–145)
SODIUM SERPL-SCNC: 142 MMOL/L — SIGNIFICANT CHANGE UP (ref 135–145)
SODIUM SERPL-SCNC: 145 MMOL/L — SIGNIFICANT CHANGE UP (ref 135–145)
SP GR SPEC: 1 — LOW (ref 1.01–1.02)
SP GR SPEC: 1.01 — SIGNIFICANT CHANGE UP (ref 1.01–1.02)
SPECIMEN SOURCE: SIGNIFICANT CHANGE UP
TIBC SERPL-MCNC: 142 UG/DL — LOW (ref 250–450)
TIBC SERPL-MCNC: 249 UG/DL — LOW (ref 250–450)
TOTAL CHOLESTEROL/HDL RATIO MEASUREMENT: 2.8 RATIO — LOW (ref 3.4–9.6)
TOTAL CHOLESTEROL/HDL RATIO MEASUREMENT: 3.1 RATIO — LOW (ref 3.4–9.6)
TRANSFERRIN SERPL-MCNC: 169 MG/DL — LOW (ref 200–360)
TRIGL SERPL-MCNC: 229 MG/DL — HIGH (ref 10–149)
TRIGL SERPL-MCNC: 85 MG/DL — SIGNIFICANT CHANGE UP (ref 10–149)
TROPONIN I SERPL-MCNC: <0.015 NG/ML — SIGNIFICANT CHANGE UP (ref 0–0.04)
TSH SERPL-MCNC: 4.33 UU/ML — SIGNIFICANT CHANGE UP (ref 0.34–4.82)
TSH SERPL-MCNC: 6.67 UU/ML — HIGH (ref 0.34–4.82)
UIBC SERPL-MCNC: 17 UG/DL — LOW (ref 110–370)
UIBC SERPL-MCNC: 80 UG/DL — LOW (ref 110–370)
UROBILINOGEN FLD QL: 1
UROBILINOGEN FLD QL: NEGATIVE — SIGNIFICANT CHANGE UP
VIT B12 SERPL-MCNC: 366 PG/ML — SIGNIFICANT CHANGE UP (ref 232–1245)
VIT B12 SERPL-MCNC: >2000 PG/ML — HIGH (ref 232–1245)
WBC # BLD: 3.4 K/UL — LOW (ref 3.8–10.5)
WBC # BLD: 3.4 K/UL — LOW (ref 3.8–10.5)
WBC # BLD: 3.6 K/UL — LOW (ref 3.8–10.5)
WBC # BLD: 3.7 K/UL — LOW (ref 3.8–10.5)
WBC # BLD: 3.9 K/UL — SIGNIFICANT CHANGE UP (ref 3.8–10.5)
WBC # BLD: 4 K/UL — SIGNIFICANT CHANGE UP (ref 3.8–10.5)
WBC # BLD: 4.1 K/UL — SIGNIFICANT CHANGE UP (ref 3.8–10.5)
WBC # BLD: 4.2 K/UL — SIGNIFICANT CHANGE UP (ref 3.8–10.5)
WBC # BLD: 4.3 K/UL — SIGNIFICANT CHANGE UP (ref 3.8–10.5)
WBC # BLD: 4.5 K/UL — SIGNIFICANT CHANGE UP (ref 3.8–10.5)
WBC # BLD: 7.5 K/UL — SIGNIFICANT CHANGE UP (ref 3.8–10.5)
WBC # FLD AUTO: 3.4 K/UL — LOW (ref 3.8–10.5)
WBC # FLD AUTO: 3.4 K/UL — LOW (ref 3.8–10.5)
WBC # FLD AUTO: 3.6 K/UL — LOW (ref 3.8–10.5)
WBC # FLD AUTO: 3.7 K/UL — LOW (ref 3.8–10.5)
WBC # FLD AUTO: 3.9 K/UL — SIGNIFICANT CHANGE UP (ref 3.8–10.5)
WBC # FLD AUTO: 4 K/UL — SIGNIFICANT CHANGE UP (ref 3.8–10.5)
WBC # FLD AUTO: 4.1 K/UL — SIGNIFICANT CHANGE UP (ref 3.8–10.5)
WBC # FLD AUTO: 4.2 K/UL — SIGNIFICANT CHANGE UP (ref 3.8–10.5)
WBC # FLD AUTO: 4.3 K/UL — SIGNIFICANT CHANGE UP (ref 3.8–10.5)
WBC # FLD AUTO: 4.5 K/UL — SIGNIFICANT CHANGE UP (ref 3.8–10.5)
WBC # FLD AUTO: 7.5 K/UL — SIGNIFICANT CHANGE UP (ref 3.8–10.5)
WBC UR QL: SIGNIFICANT CHANGE UP /HPF (ref 0–5)

## 2018-01-01 PROCEDURE — 71045 X-RAY EXAM CHEST 1 VIEW: CPT | Mod: 26

## 2018-01-01 PROCEDURE — 86901 BLOOD TYPING SEROLOGIC RH(D): CPT

## 2018-01-01 PROCEDURE — 82550 ASSAY OF CK (CPK): CPT

## 2018-01-01 PROCEDURE — 99285 EMERGENCY DEPT VISIT HI MDM: CPT

## 2018-01-01 PROCEDURE — 86900 BLOOD TYPING SEROLOGIC ABO: CPT

## 2018-01-01 PROCEDURE — 80048 BASIC METABOLIC PNL TOTAL CA: CPT

## 2018-01-01 PROCEDURE — 85610 PROTHROMBIN TIME: CPT

## 2018-01-01 PROCEDURE — 86850 RBC ANTIBODY SCREEN: CPT

## 2018-01-01 PROCEDURE — 71045 X-RAY EXAM CHEST 1 VIEW: CPT

## 2018-01-01 PROCEDURE — 99285 EMERGENCY DEPT VISIT HI MDM: CPT | Mod: 25

## 2018-01-01 PROCEDURE — 84484 ASSAY OF TROPONIN QUANT: CPT

## 2018-01-01 PROCEDURE — 36430 TRANSFUSION BLD/BLD COMPNT: CPT

## 2018-01-01 PROCEDURE — 85730 THROMBOPLASTIN TIME PARTIAL: CPT

## 2018-01-01 PROCEDURE — 82746 ASSAY OF FOLIC ACID SERUM: CPT

## 2018-01-01 PROCEDURE — 99223 1ST HOSP IP/OBS HIGH 75: CPT

## 2018-01-01 PROCEDURE — 82553 CREATINE MB FRACTION: CPT

## 2018-01-01 PROCEDURE — 86923 COMPATIBILITY TEST ELECTRIC: CPT

## 2018-01-01 PROCEDURE — 93005 ELECTROCARDIOGRAM TRACING: CPT

## 2018-01-01 PROCEDURE — P9040: CPT

## 2018-01-01 PROCEDURE — 83036 HEMOGLOBIN GLYCOSYLATED A1C: CPT

## 2018-01-01 PROCEDURE — 83690 ASSAY OF LIPASE: CPT

## 2018-01-01 PROCEDURE — 85045 AUTOMATED RETICULOCYTE COUNT: CPT

## 2018-01-01 PROCEDURE — 99497 ADVNCD CARE PLAN 30 MIN: CPT

## 2018-01-01 PROCEDURE — 85027 COMPLETE CBC AUTOMATED: CPT

## 2018-01-01 PROCEDURE — 99233 SBSQ HOSP IP/OBS HIGH 50: CPT | Mod: 25

## 2018-01-01 PROCEDURE — 83735 ASSAY OF MAGNESIUM: CPT

## 2018-01-01 PROCEDURE — 84443 ASSAY THYROID STIM HORMONE: CPT

## 2018-01-01 PROCEDURE — 82607 VITAMIN B-12: CPT

## 2018-01-01 PROCEDURE — 84100 ASSAY OF PHOSPHORUS: CPT

## 2018-01-01 PROCEDURE — 82962 GLUCOSE BLOOD TEST: CPT

## 2018-01-01 PROCEDURE — 81001 URINALYSIS AUTO W/SCOPE: CPT

## 2018-01-01 PROCEDURE — 70450 CT HEAD/BRAIN W/O DYE: CPT | Mod: 26

## 2018-01-01 PROCEDURE — 80053 COMPREHEN METABOLIC PANEL: CPT

## 2018-01-01 PROCEDURE — 83550 IRON BINDING TEST: CPT

## 2018-01-01 PROCEDURE — 70450 CT HEAD/BRAIN W/O DYE: CPT

## 2018-01-01 PROCEDURE — 99497 ADVNCD CARE PLAN 30 MIN: CPT | Mod: 25

## 2018-01-01 PROCEDURE — 83605 ASSAY OF LACTIC ACID: CPT

## 2018-01-01 PROCEDURE — 99233 SBSQ HOSP IP/OBS HIGH 50: CPT

## 2018-01-01 PROCEDURE — 87040 BLOOD CULTURE FOR BACTERIA: CPT

## 2018-01-01 PROCEDURE — 36415 COLL VENOUS BLD VENIPUNCTURE: CPT

## 2018-01-01 PROCEDURE — 80061 LIPID PANEL: CPT

## 2018-01-01 PROCEDURE — 99291 CRITICAL CARE FIRST HOUR: CPT

## 2018-01-01 PROCEDURE — 82728 ASSAY OF FERRITIN: CPT

## 2018-01-01 PROCEDURE — 87086 URINE CULTURE/COLONY COUNT: CPT

## 2018-01-01 PROCEDURE — 83880 ASSAY OF NATRIURETIC PEPTIDE: CPT

## 2018-01-01 PROCEDURE — 97161 PT EVAL LOW COMPLEX 20 MIN: CPT

## 2018-01-01 RX ORDER — ENOXAPARIN SODIUM 100 MG/ML
40 INJECTION SUBCUTANEOUS DAILY
Qty: 0 | Refills: 0 | Status: DISCONTINUED | OUTPATIENT
Start: 2018-01-01 | End: 2018-01-01

## 2018-01-01 RX ORDER — ACETAMINOPHEN 500 MG
650 TABLET ORAL EVERY 6 HOURS
Qty: 0 | Refills: 0 | Status: DISCONTINUED | OUTPATIENT
Start: 2018-01-01 | End: 2018-01-01

## 2018-01-01 RX ORDER — PREGABALIN 225 MG/1
1000 CAPSULE ORAL DAILY
Qty: 0 | Refills: 0 | Status: DISCONTINUED | OUTPATIENT
Start: 2018-01-01 | End: 2018-01-01

## 2018-01-01 RX ORDER — MORPHINE SULFATE 50 MG/1
1 CAPSULE, EXTENDED RELEASE ORAL EVERY 6 HOURS
Qty: 0 | Refills: 0 | Status: DISCONTINUED | OUTPATIENT
Start: 2018-01-01 | End: 2018-01-01

## 2018-01-01 RX ORDER — ERYTHROPOIETIN 10000 [IU]/ML
10000 INJECTION, SOLUTION INTRAVENOUS; SUBCUTANEOUS
Qty: 0 | Refills: 0 | Status: DISCONTINUED | OUTPATIENT
Start: 2018-01-01 | End: 2018-01-01

## 2018-01-01 RX ORDER — SODIUM CHLORIDE 9 MG/ML
2100 INJECTION INTRAMUSCULAR; INTRAVENOUS; SUBCUTANEOUS ONCE
Qty: 0 | Refills: 0 | Status: COMPLETED | OUTPATIENT
Start: 2018-01-01 | End: 2018-01-01

## 2018-01-01 RX ORDER — IRON SUCROSE 20 MG/ML
200 INJECTION, SOLUTION INTRAVENOUS EVERY 24 HOURS
Qty: 0 | Refills: 0 | Status: COMPLETED | OUTPATIENT
Start: 2018-01-01 | End: 2018-01-01

## 2018-01-01 RX ORDER — ERYTHROPOIETIN 10000 [IU]/ML
10000 INJECTION, SOLUTION INTRAVENOUS; SUBCUTANEOUS
Qty: 0 | Refills: 0 | COMMUNITY

## 2018-01-01 RX ORDER — ACETAMINOPHEN 500 MG
2 TABLET ORAL
Qty: 84 | Refills: 0 | OUTPATIENT
Start: 2018-01-01 | End: 2018-01-01

## 2018-01-01 RX ORDER — ACETAMINOPHEN 500 MG
1000 TABLET ORAL ONCE
Qty: 0 | Refills: 0 | Status: COMPLETED | OUTPATIENT
Start: 2018-01-01 | End: 2018-01-01

## 2018-01-01 RX ORDER — PANTOPRAZOLE SODIUM 20 MG/1
1 TABLET, DELAYED RELEASE ORAL
Qty: 0 | Refills: 0 | COMMUNITY

## 2018-01-01 RX ORDER — PANTOPRAZOLE SODIUM 20 MG/1
40 TABLET, DELAYED RELEASE ORAL
Qty: 0 | Refills: 0 | Status: DISCONTINUED | OUTPATIENT
Start: 2018-01-01 | End: 2018-01-01

## 2018-01-01 RX ORDER — POTASSIUM CHLORIDE 20 MEQ
40 PACKET (EA) ORAL ONCE
Qty: 0 | Refills: 0 | Status: COMPLETED | OUTPATIENT
Start: 2018-01-01 | End: 2018-01-01

## 2018-01-01 RX ORDER — MEROPENEM 1 G/30ML
1000 INJECTION INTRAVENOUS ONCE
Qty: 0 | Refills: 0 | Status: COMPLETED | OUTPATIENT
Start: 2018-01-01 | End: 2018-01-01

## 2018-01-01 RX ORDER — BICALUTAMIDE 50 MG/1
50 TABLET, FILM COATED ORAL DAILY
Qty: 0 | Refills: 0 | Status: DISCONTINUED | OUTPATIENT
Start: 2018-01-01 | End: 2018-01-01

## 2018-01-01 RX ORDER — PREGABALIN 225 MG/1
1000 CAPSULE ORAL ONCE
Qty: 0 | Refills: 0 | Status: COMPLETED | OUTPATIENT
Start: 2018-01-01 | End: 2018-01-01

## 2018-01-01 RX ORDER — MEROPENEM 1 G/30ML
INJECTION INTRAVENOUS
Qty: 0 | Refills: 0 | Status: DISCONTINUED | OUTPATIENT
Start: 2018-01-01 | End: 2018-01-01

## 2018-01-01 RX ORDER — POTASSIUM CHLORIDE 20 MEQ
40 PACKET (EA) ORAL
Qty: 0 | Refills: 0 | Status: COMPLETED | OUTPATIENT
Start: 2018-01-01 | End: 2018-01-01

## 2018-01-01 RX ORDER — DIGOXIN 250 MCG
0.25 TABLET ORAL EVERY 6 HOURS
Qty: 0 | Refills: 0 | Status: COMPLETED | OUTPATIENT
Start: 2018-01-01 | End: 2018-01-01

## 2018-01-01 RX ORDER — FOLIC ACID 0.8 MG
1 TABLET ORAL
Qty: 0 | Refills: 0 | COMMUNITY

## 2018-01-01 RX ORDER — KETOROLAC TROMETHAMINE 30 MG/ML
30 SYRINGE (ML) INJECTION ONCE
Qty: 0 | Refills: 0 | Status: DISCONTINUED | OUTPATIENT
Start: 2018-01-01 | End: 2018-01-01

## 2018-01-01 RX ORDER — ASCORBIC ACID 60 MG
1 TABLET,CHEWABLE ORAL
Qty: 30 | Refills: 0 | OUTPATIENT
Start: 2018-01-01 | End: 2018-01-01

## 2018-01-01 RX ORDER — DEXTROSE 50 % IN WATER 50 %
50 SYRINGE (ML) INTRAVENOUS ONCE
Qty: 0 | Refills: 0 | Status: COMPLETED | OUTPATIENT
Start: 2018-01-01 | End: 2018-01-01

## 2018-01-01 RX ORDER — LANOLIN ALCOHOL/MO/W.PET/CERES
3 CREAM (GRAM) TOPICAL AT BEDTIME
Qty: 0 | Refills: 0 | Status: COMPLETED | OUTPATIENT
Start: 2018-01-01 | End: 2018-01-01

## 2018-01-01 RX ORDER — BICALUTAMIDE 50 MG/1
0 TABLET, FILM COATED ORAL
Qty: 30 | Refills: 0 | COMMUNITY

## 2018-01-01 RX ORDER — ASCORBIC ACID 60 MG
500 TABLET,CHEWABLE ORAL DAILY
Qty: 0 | Refills: 0 | Status: DISCONTINUED | OUTPATIENT
Start: 2018-01-01 | End: 2018-01-01

## 2018-01-01 RX ORDER — SODIUM CHLORIDE 9 MG/ML
3 INJECTION INTRAMUSCULAR; INTRAVENOUS; SUBCUTANEOUS ONCE
Qty: 0 | Refills: 0 | Status: COMPLETED | OUTPATIENT
Start: 2018-01-01 | End: 2018-01-01

## 2018-01-01 RX ORDER — FERROUS SULFATE 325(65) MG
325 TABLET ORAL DAILY
Qty: 0 | Refills: 0 | Status: DISCONTINUED | OUTPATIENT
Start: 2018-01-01 | End: 2018-01-01

## 2018-01-01 RX ORDER — SODIUM CHLORIDE 9 MG/ML
1000 INJECTION INTRAMUSCULAR; INTRAVENOUS; SUBCUTANEOUS ONCE
Qty: 0 | Refills: 0 | Status: COMPLETED | OUTPATIENT
Start: 2018-01-01 | End: 2018-01-01

## 2018-01-01 RX ORDER — TRAMADOL HYDROCHLORIDE 50 MG/1
1 TABLET ORAL
Qty: 0 | Refills: 0 | COMMUNITY

## 2018-01-01 RX ORDER — HALOPERIDOL DECANOATE 100 MG/ML
0.5 INJECTION INTRAMUSCULAR ONCE
Qty: 0 | Refills: 0 | Status: DISCONTINUED | OUTPATIENT
Start: 2018-01-01 | End: 2018-01-01

## 2018-01-01 RX ORDER — DEXAMETHASONE 0.5 MG/5ML
4 ELIXIR ORAL
Qty: 0 | Refills: 0 | COMMUNITY
Start: 2018-01-01

## 2018-01-01 RX ORDER — DEXAMETHASONE 0.5 MG/5ML
4 ELIXIR ORAL
Qty: 0 | Refills: 0 | Status: DISCONTINUED | OUTPATIENT
Start: 2018-01-01 | End: 2018-01-01

## 2018-01-01 RX ORDER — PREGABALIN 225 MG/1
1 CAPSULE ORAL
Qty: 30 | Refills: 0 | OUTPATIENT
Start: 2018-01-01 | End: 2018-01-01

## 2018-01-01 RX ORDER — AZTREONAM 2 G
1000 VIAL (EA) INJECTION ONCE
Qty: 0 | Refills: 0 | Status: COMPLETED | OUTPATIENT
Start: 2018-01-01 | End: 2018-01-01

## 2018-01-01 RX ORDER — BICALUTAMIDE 50 MG/1
1 TABLET, FILM COATED ORAL
Qty: 0 | Refills: 0 | COMMUNITY
Start: 2018-01-01

## 2018-01-01 RX ORDER — MEROPENEM 1 G/30ML
1000 INJECTION INTRAVENOUS EVERY 8 HOURS
Qty: 0 | Refills: 0 | Status: DISCONTINUED | OUTPATIENT
Start: 2018-01-01 | End: 2018-01-01

## 2018-01-01 RX ORDER — BENZOYL PEROXIDE MICRONIZED 5.8 %
1 TOWELETTE (EA) TOPICAL
Qty: 0 | Refills: 0 | COMMUNITY

## 2018-01-01 RX ORDER — MORPHINE SULFATE 50 MG/1
2 CAPSULE, EXTENDED RELEASE ORAL ONCE
Qty: 0 | Refills: 0 | Status: DISCONTINUED | OUTPATIENT
Start: 2018-01-01 | End: 2018-01-01

## 2018-01-01 RX ORDER — DIGOXIN 250 MCG
0.5 TABLET ORAL ONCE
Qty: 0 | Refills: 0 | Status: COMPLETED | OUTPATIENT
Start: 2018-01-01 | End: 2018-01-01

## 2018-01-01 RX ORDER — FERROUS SULFATE 325(65) MG
1 TABLET ORAL
Qty: 30 | Refills: 0 | OUTPATIENT
Start: 2018-01-01 | End: 2018-01-01

## 2018-01-01 RX ADMIN — ENOXAPARIN SODIUM 40 MILLIGRAM(S): 100 INJECTION SUBCUTANEOUS at 12:17

## 2018-01-01 RX ADMIN — PANTOPRAZOLE SODIUM 40 MILLIGRAM(S): 20 TABLET, DELAYED RELEASE ORAL at 06:31

## 2018-01-01 RX ADMIN — Medication 650 MILLIGRAM(S): at 06:50

## 2018-01-01 RX ADMIN — Medication 650 MILLIGRAM(S): at 20:07

## 2018-01-01 RX ADMIN — Medication 650 MILLIGRAM(S): at 06:06

## 2018-01-01 RX ADMIN — ENOXAPARIN SODIUM 40 MILLIGRAM(S): 100 INJECTION SUBCUTANEOUS at 12:58

## 2018-01-01 RX ADMIN — BICALUTAMIDE 50 MILLIGRAM(S): 50 TABLET, FILM COATED ORAL at 11:37

## 2018-01-01 RX ADMIN — ENOXAPARIN SODIUM 40 MILLIGRAM(S): 100 INJECTION SUBCUTANEOUS at 12:45

## 2018-01-01 RX ADMIN — ENOXAPARIN SODIUM 40 MILLIGRAM(S): 100 INJECTION SUBCUTANEOUS at 12:27

## 2018-01-01 RX ADMIN — Medication 30 MILLIGRAM(S): at 16:17

## 2018-01-01 RX ADMIN — Medication 4 MILLIGRAM(S): at 17:32

## 2018-01-01 RX ADMIN — Medication 40 MILLIEQUIVALENT(S): at 20:41

## 2018-01-01 RX ADMIN — Medication 650 MILLIGRAM(S): at 17:21

## 2018-01-01 RX ADMIN — Medication 0.5 MILLIGRAM(S): at 16:17

## 2018-01-01 RX ADMIN — ENOXAPARIN SODIUM 40 MILLIGRAM(S): 100 INJECTION SUBCUTANEOUS at 11:40

## 2018-01-01 RX ADMIN — BICALUTAMIDE 50 MILLIGRAM(S): 50 TABLET, FILM COATED ORAL at 12:41

## 2018-01-01 RX ADMIN — Medication 650 MILLIGRAM(S): at 18:30

## 2018-01-01 RX ADMIN — Medication 40 MILLIEQUIVALENT(S): at 16:17

## 2018-01-01 RX ADMIN — Medication 4 MILLIGRAM(S): at 17:35

## 2018-01-01 RX ADMIN — Medication 100 MILLIGRAM(S): at 18:23

## 2018-01-01 RX ADMIN — Medication 4 MILLIGRAM(S): at 05:44

## 2018-01-01 RX ADMIN — ENOXAPARIN SODIUM 40 MILLIGRAM(S): 100 INJECTION SUBCUTANEOUS at 12:24

## 2018-01-01 RX ADMIN — Medication 650 MILLIGRAM(S): at 23:40

## 2018-01-01 RX ADMIN — Medication 100 MILLIGRAM(S): at 21:00

## 2018-01-01 RX ADMIN — Medication 4 MILLIGRAM(S): at 05:45

## 2018-01-01 RX ADMIN — ENOXAPARIN SODIUM 40 MILLIGRAM(S): 100 INJECTION SUBCUTANEOUS at 12:46

## 2018-01-01 RX ADMIN — PANTOPRAZOLE SODIUM 40 MILLIGRAM(S): 20 TABLET, DELAYED RELEASE ORAL at 06:07

## 2018-01-01 RX ADMIN — Medication 650 MILLIGRAM(S): at 19:22

## 2018-01-01 RX ADMIN — Medication 650 MILLIGRAM(S): at 00:26

## 2018-01-01 RX ADMIN — Medication 20 MILLIGRAM(S): at 22:49

## 2018-01-01 RX ADMIN — Medication 4 MILLIGRAM(S): at 18:06

## 2018-01-01 RX ADMIN — Medication 4 MILLIGRAM(S): at 17:41

## 2018-01-01 RX ADMIN — Medication 400 MILLIGRAM(S): at 23:41

## 2018-01-01 RX ADMIN — ENOXAPARIN SODIUM 40 MILLIGRAM(S): 100 INJECTION SUBCUTANEOUS at 12:08

## 2018-01-01 RX ADMIN — Medication 4 MILLIGRAM(S): at 17:03

## 2018-01-01 RX ADMIN — Medication 650 MILLIGRAM(S): at 21:43

## 2018-01-01 RX ADMIN — Medication 650 MILLIGRAM(S): at 22:59

## 2018-01-01 RX ADMIN — IRON SUCROSE 210 MILLIGRAM(S): 20 INJECTION, SOLUTION INTRAVENOUS at 11:40

## 2018-01-01 RX ADMIN — Medication 650 MILLIGRAM(S): at 15:10

## 2018-01-01 RX ADMIN — Medication 650 MILLIGRAM(S): at 21:48

## 2018-01-01 RX ADMIN — Medication 650 MILLIGRAM(S): at 22:30

## 2018-01-01 RX ADMIN — Medication 650 MILLIGRAM(S): at 05:09

## 2018-01-01 RX ADMIN — Medication 0.25 MILLIGRAM(S): at 20:40

## 2018-01-01 RX ADMIN — Medication 1000 MILLIGRAM(S): at 12:39

## 2018-01-01 RX ADMIN — Medication 650 MILLIGRAM(S): at 16:06

## 2018-01-01 RX ADMIN — Medication 400 MILLIGRAM(S): at 07:47

## 2018-01-01 RX ADMIN — Medication 650 MILLIGRAM(S): at 17:43

## 2018-01-01 RX ADMIN — Medication 4 MILLIGRAM(S): at 17:21

## 2018-01-01 RX ADMIN — Medication 50 MILLILITER(S): at 09:45

## 2018-01-01 RX ADMIN — IRON SUCROSE 210 MILLIGRAM(S): 20 INJECTION, SOLUTION INTRAVENOUS at 10:58

## 2018-01-01 RX ADMIN — Medication 3 MILLIGRAM(S): at 01:11

## 2018-01-01 RX ADMIN — MEROPENEM 100 MILLIGRAM(S): 1 INJECTION INTRAVENOUS at 00:40

## 2018-01-01 RX ADMIN — ENOXAPARIN SODIUM 40 MILLIGRAM(S): 100 INJECTION SUBCUTANEOUS at 11:37

## 2018-01-01 RX ADMIN — SODIUM CHLORIDE 1000 MILLILITER(S): 9 INJECTION INTRAMUSCULAR; INTRAVENOUS; SUBCUTANEOUS at 23:41

## 2018-01-01 RX ADMIN — Medication 4 MILLIGRAM(S): at 20:40

## 2018-01-01 RX ADMIN — PREGABALIN 1000 MICROGRAM(S): 225 CAPSULE ORAL at 12:27

## 2018-01-01 RX ADMIN — Medication 50 MILLIGRAM(S): at 13:49

## 2018-01-01 RX ADMIN — Medication 4 MILLIGRAM(S): at 05:30

## 2018-01-01 RX ADMIN — BICALUTAMIDE 50 MILLIGRAM(S): 50 TABLET, FILM COATED ORAL at 12:25

## 2018-01-01 RX ADMIN — ENOXAPARIN SODIUM 40 MILLIGRAM(S): 100 INJECTION SUBCUTANEOUS at 12:25

## 2018-01-01 RX ADMIN — Medication 650 MILLIGRAM(S): at 02:45

## 2018-01-01 RX ADMIN — PANTOPRAZOLE SODIUM 40 MILLIGRAM(S): 20 TABLET, DELAYED RELEASE ORAL at 05:52

## 2018-01-01 RX ADMIN — Medication 0.25 MILLIGRAM(S): at 01:38

## 2018-01-01 RX ADMIN — Medication 650 MILLIGRAM(S): at 01:00

## 2018-01-01 RX ADMIN — ENOXAPARIN SODIUM 40 MILLIGRAM(S): 100 INJECTION SUBCUTANEOUS at 13:03

## 2018-01-01 RX ADMIN — Medication 1000 MILLIGRAM(S): at 00:00

## 2018-01-01 RX ADMIN — Medication 650 MILLIGRAM(S): at 05:50

## 2018-01-01 RX ADMIN — IRON SUCROSE 210 MILLIGRAM(S): 20 INJECTION, SOLUTION INTRAVENOUS at 10:14

## 2018-01-01 RX ADMIN — SODIUM CHLORIDE 2100 MILLILITER(S): 9 INJECTION INTRAMUSCULAR; INTRAVENOUS; SUBCUTANEOUS at 13:00

## 2018-01-01 RX ADMIN — Medication 4 MILLIGRAM(S): at 17:50

## 2018-01-01 RX ADMIN — ENOXAPARIN SODIUM 40 MILLIGRAM(S): 100 INJECTION SUBCUTANEOUS at 12:41

## 2018-01-01 RX ADMIN — Medication 4 MILLIGRAM(S): at 05:09

## 2018-01-01 RX ADMIN — ENOXAPARIN SODIUM 40 MILLIGRAM(S): 100 INJECTION SUBCUTANEOUS at 17:41

## 2018-01-01 RX ADMIN — SODIUM CHLORIDE 2100 MILLILITER(S): 9 INJECTION INTRAMUSCULAR; INTRAVENOUS; SUBCUTANEOUS at 14:30

## 2018-01-01 RX ADMIN — ENOXAPARIN SODIUM 40 MILLIGRAM(S): 100 INJECTION SUBCUTANEOUS at 11:13

## 2018-01-01 RX ADMIN — Medication 400 MILLIGRAM(S): at 13:49

## 2018-01-01 RX ADMIN — BICALUTAMIDE 50 MILLIGRAM(S): 50 TABLET, FILM COATED ORAL at 12:47

## 2018-01-01 RX ADMIN — Medication 4 MILLIGRAM(S): at 05:39

## 2018-01-01 RX ADMIN — SODIUM CHLORIDE 3 MILLILITER(S): 9 INJECTION INTRAMUSCULAR; INTRAVENOUS; SUBCUTANEOUS at 07:36

## 2018-01-01 RX ADMIN — Medication 4 MILLIGRAM(S): at 05:50

## 2018-01-01 RX ADMIN — Medication 4 MILLIGRAM(S): at 06:09

## 2018-01-01 RX ADMIN — Medication 650 MILLIGRAM(S): at 18:02

## 2018-01-01 RX ADMIN — Medication 40 MILLIEQUIVALENT(S): at 10:15

## 2018-01-01 RX ADMIN — Medication 1000 MILLIGRAM(S): at 16:18

## 2018-01-01 RX ADMIN — SODIUM CHLORIDE 1000 MILLILITER(S): 9 INJECTION INTRAMUSCULAR; INTRAVENOUS; SUBCUTANEOUS at 07:47

## 2018-01-01 RX ADMIN — Medication 650 MILLIGRAM(S): at 22:53

## 2018-05-16 NOTE — ED PROVIDER NOTE - PROGRESS NOTE DETAILS
Erich Potts- patient's nephew [phone] received sign out from night team. pending labs. hb 6.6, pt reports prior h/o anemia, feels very weak. agrees with transfusion, has had transfusions before.

## 2018-05-16 NOTE — PROGRESS NOTE ADULT - SUBJECTIVE AND OBJECTIVE BOX
Patent has deteriorated medically over past few weeks, with severe weakness/anorexia.  Known Metastatic and Advanced Prostate Cancer. Requiring frequent blood transfusions.   No Medical problems.  PE: Alert. Weak. Ill Looking.  VS: Stable.    Lungs: Clear.  Cor: NSRS4  Abd: Benign  No edema    Labs: H/H low Hgb 6.6, symptomatic.  CMP High Alkaline Phosphate.  CXR: Metastatic bone lesions.

## 2018-05-16 NOTE — ED ADULT NURSE NOTE - ED STAT RN HANDOFF DETAILS 2
Report received from GLORIA Kenney RN. Patient in bed. AA&Ox3. Breathing nonlabored on room air. Complaining of "pain all over my body; I have prostate cancer." NSR on cardiac monitor. Denies chest pain or SOB. Ambulatory using cane. Bed locked and on lowest position, side rails up x2.

## 2018-05-16 NOTE — ED PROVIDER NOTE - MEDICAL DECISION MAKING DETAILS
89yo M with prostate CA and anemia requiring transfusions presents with body aches. Will obtain EKG, labs, CXR, UA. Given IVF for hydration. 89yo M with prostate CA and anemia requiring transfusions presents with body aches. Will obtain EKG, labs, CXR, UA. Given IVF for hydration. Given IV tylenol for pain. patient signed out at 8am to Dr. Rosales. 89yo M with prostate CA and anemia requiring transfusions presents with body aches. Will obtain EKG, labs, CXR, UA. Given IVF for hydration. Given IV tylenol for pain. patient signed out at am team.    anemic, needs transfusion, feels very weak. admit for further workup and mgmt

## 2018-05-16 NOTE — ED ADULT NURSE NOTE - OBJECTIVE STATEMENT
Pt. called son around 5 AM c/o generalized body. Pt. called son around 5 AM c/o generalized body, denies any other symptoms.

## 2018-05-16 NOTE — H&P ADULT - PROBLEM SELECTOR PLAN 1
advancing cancer causing significant pain  -will get palliative and hospice eval  -c/w casadex for now

## 2018-05-16 NOTE — H&P ADULT - ASSESSMENT
89M from home, ambulates with cane, no HHA, independant in ADL PMH metastatic prostate cancer with mets to the bone and liver, only on po chemotherapy (Onc Dr. Bowman) who comes to the body with generalized body aches and bone pains likely from advancing cancer. Will admit for transfusion and palliative eval

## 2018-05-16 NOTE — H&P ADULT - PROBLEM SELECTOR PLAN 2
likely secondary to cancer  -patient gets frequent transfusions  -will transfuse 2 units of prbc  -will get type and screen

## 2018-05-16 NOTE — H&P ADULT - NSHPPHYSICALEXAM_GEN_ALL_CORE
Vital Signs Last 24 Hrs  T(C): 36.9 (16 May 2018 10:40), Max: 36.9 (16 May 2018 07:28)  T(F): 98.5 (16 May 2018 10:40), Max: 98.5 (16 May 2018 10:40)  HR: 69 (16 May 2018 10:40) (69 - 98)  BP: 130/64 (16 May 2018 10:40) (106/45 - 130/64)  BP(mean): --  RR: 19 (16 May 2018 07:28) (16 - 19)  SpO2: 96% (16 May 2018 10:40) (96% - 100%)    GENERAL: NAD, thin, emancipated  HEAD:  Atraumatic, Normocephalic  EYES: EOMI, PERRLA, conjunctiva and sclera clear  ENMT: No tonsillar erythema, exudates, or enlargement; Moist mucous membranes  NECK: Supple, No JVD, Normal thyroid  NERVOUS SYSTEM:  Alert & Oriented X3  CHEST/LUNG: Clear to auscultation bilaterally; No rales, rhonchi, wheezing, or rubs  HEART: Regular rate and rhythm; No murmurs, rubs, or gallops  ABDOMEN: Soft, Nontender, Nondistended; Bowel sounds present  EXTREMITIES:  2+ Peripheral Pulses, No clubbing, cyanosis, or edema

## 2018-05-16 NOTE — H&P ADULT - PROBLEM SELECTOR PLAN 3
IMPROVE VTE Individual Risk Assessment    RISK                                                          Points  [] Previous VTE                                           3  [] Thrombophilia                                        2  [] Lower limb paralysis                              2   [x] Current Cancer                                       2   [x] Immobilization > 24 hrs                        1  [] ICU/CCU stay > 24 hours                       1  [x] Age > 60                                                   1    IMPROVE VTE Score: 4, will give chem dvt ppx with lovenox

## 2018-05-16 NOTE — H&P ADULT - HISTORY OF PRESENT ILLNESS
89M from home, ambulates with cane, no HHA, independant in ADL PMH metastatic prostate cancer with mets to the bone and liver, only on po chemotherapy (Onc Dr. Bowman) who comes to the body with generalized body aches and bone pains. Patient has chronic pain but had significant worsening this morning with pain over the entire body and when he moves it causes more pain and the bones are very achy. Patient also endorses weight loss of 20 lbs in recent months. He called Dr. Clemente who recommended he come to the hospital for evaluation.

## 2018-05-16 NOTE — ED PROVIDER NOTE - PMH
Anemia    Cataract of left eye    GERD (gastroesophageal reflux disease)    Prostate cancer metastatic to multiple sites

## 2018-05-16 NOTE — PATIENT PROFILE ADULT. - FALL HARM RISK
fatigue. anemia/age(85 years old or older)/bones(Osteoporosis,prev fx,steroid use,metastatic bone ca/other

## 2018-05-16 NOTE — H&P ADULT - NSHPREVIEWOFSYSTEMS_GEN_ALL_CORE
REVIEW OF SYSTEMS:  CONSTITUTIONAL: body pains  EYES: No eye pain, visual disturbances, or discharge  ENMT:  No difficulty hearing, tinnitus, vertigo; No sinus or throat pain  NECK: No pain or stiffness  RESPIRATORY: No cough, wheezing, chills or hemoptysis; No shortness of breath  CARDIOVASCULAR: No chest pain, palpitations, dizziness, or leg swelling  GASTROINTESTINAL: No abdominal or epigastric pain. No nausea, vomiting, or hematemesis; No diarrhea or constipation. No melena or hematochezia.  GENITOURINARY: No dysuria, frequency, hematuria, or incontinence  NEUROLOGICAL: No headaches, memory loss, loss of strength, numbness, or tremors  SKIN: No itching, burning, rashes, or lesions   LYMPH NODES: No enlarged glands  ENDOCRINE: No heat or cold intolerance; No hair loss  MUSCULOSKELETAL: No joint pain or swelling; No muscle, back, or extremity pain  PSYCHIATRIC: No depression, anxiety, mood swings, or difficulty sleeping  HEME/LYMPH: No easy bruising, or bleeding gums  ALLERY AND IMMUNOLOGIC: No hives or eczema

## 2018-05-17 NOTE — CONSULT NOTE ADULT - PROBLEM SELECTOR RECOMMENDATION 3
Patient lives alone, has some help from neighbors; according to nephew has been increasingly debilitated over the last few months, requiring more help at home and more frequent medical care

## 2018-05-17 NOTE — CONSULT NOTE ADULT - SUBJECTIVE AND OBJECTIVE BOX
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  He has severe anemia required repeated transfusion due to mets to bones.  he supposed to take prednisone 10 mg a day at home for bone pain and the pain was under control  but it recently got worse and he became very weak and lost 20 lb of weight.  He can not pay the copay for zytiga or xtandi.  there was alex fundation to help either.  alert, no sob  no palpable nodes at neck. chest is clear. abd is soft and flat. no leg edema                        8.7    3.9   )-----------( 96       ( 17 May 2018 05:59 ) Hb was 6.6 at admission             27.6   05-17    139  |  107  |  19<H>  ----------------------------<  78  3.8   |  24  |  0.90    Ca    7.8<L>      17 May 2018 05:59  Phos  2.5     05-17  Mg     2.3     05-17    TPro  6.0  /  Alb  2.4<L>  /  TBili  0.6  /  DBili  x   /  AST  60<H>  /  ALT  14  /  AlkPhos  572<H>  05-16

## 2018-05-17 NOTE — CONSULT NOTE ADULT - SUBJECTIVE AND OBJECTIVE BOX
HPI:  89M from home, ambulates with cane, no HHA, independant in ADL PMH metastatic prostate cancer with mets to the bone and liver, only on po chemotherapy (Onc Dr. Bowman) who comes to the body with generalized body aches and bone pains. Patient has chronic pain but had significant worsening this morning with pain over the entire body and when he moves it causes more pain and the bones are very achy. Patient also endorses weight loss of 20 lbs in recent months. He called Dr. Clemente who recommended he come to the hospital for evaluation. (16 May 2018 10:52)      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  Cataract of left eye  GERD (gastroesophageal reflux disease)  History of bilateral cataract extraction      SOCIAL HISTORY:    Admitted from:  home assisted living [facility]   Substance abuse history:              Tobacco hx:                  Alcohol hx:              Home Opioid hx:  Uatsdin:                                    Preferred Language:    Surrogate/HCP/Guardian:            Phone#:    FAMILY HISTORY:  No pertinent family history in first degree relatives    Baseline ADLs (prior to admission):    Allergies    penicillin (Rash)    Intolerances    Aspir 81 (Other)    Present Symptoms:   Dyspnea:   Nausea/Vomiting:   Anxiety:  Depressed   Fatigue:  Loss of appetite:   Pain:                                location:          Review of Systems: [All others negative or Unable to obtain due to poor mentation]    MEDICATIONS  (STANDING):  bicalutamide 50 milliGRAM(s) Oral daily  enoxaparin Injectable 40 milliGRAM(s) SubCutaneous daily    MEDICATIONS  (PRN):  acetaminophen   Tablet 650 milliGRAM(s) Oral every 6 hours PRN For Temp greater than 38 C (100.4 F)  acetaminophen   Tablet. 650 milliGRAM(s) Oral every 6 hours PRN Mild Pain (1 - 3)      PHYSICAL EXAM:    Vital Signs Last 24 Hrs  T(C): 36.4 (17 May 2018 08:06), Max: 38.9 (17 May 2018 01:48)  T(F): 97.6 (17 May 2018 08:06), Max: 102.1 (17 May 2018 01:48)  HR: 70 (17 May 2018 08:06) (57 - 88)  BP: 121/57 (17 May 2018 08:06) (114/51 - 149/63)  BP(mean): --  RR: 19 (17 May 2018 08:06) (18 - 19)  SpO2: 99% (17 May 2018 08:06) (96% - 100%)    General: alert  oriented x ____    [lethargic distressed cachexia  nonverbal  unarousable verbal]  Karnofsky Performance Score/Palliative Performance Status Version2:     %    HEENT: normal  dry mouth  ET tube/trach oral lesions:  Lungs: comfortable tachypnea/labored breathing  excessive secretions  CV: normal  tachycardia  GI: normal  distended  tender  incontinent               PEG/NG/OG tube  constipation  last BM:   : normal  incontinent  oliguria/anuria  barcenas  Musculoskeletal: normal  weakness  edema             ambulatory  bedbound/wheelchair bound  Skin: normal  pressure ulcers: stage: edema: other:  Neuro: no deficits cognitive impairment dsyphagia/dysarthria paresis: other:  Oral intake ability: unable/only mouth care [minimal moderate full capability]  Diet: [NPO]    LABS:                        8.7    3.9   )-----------( 96       ( 17 May 2018 05:59 )             27.6     05-17    139  |  107  |  19<H>  ----------------------------<  78  3.8   |  24  |  0.90    Ca    7.8<L>      17 May 2018 05:59  Phos  2.5     05-17  Mg     2.3     05-17    TPro  6.0  /  Alb  2.4<L>  /  TBili  0.6  /  DBili  x   /  AST  60<H>  /  ALT  14  /  AlkPhos  572<H>  05-16        RADIOLOGY & ADDITIONAL STUDIES:    ADVANCE DIRECTIVES:   Advanced Care Planning discussion total time spent: 89M from home, ambulates with cane, no HHA, independant in ADL PMH metastatic prostate cancer with mets to the bone and liver, only on po chemotherapy (Onc Dr. Bowman) who comes to the hospital with generalized body aches and bone pains. Patient has chronic pain but had significant worsening this morning with pain over the entire body and when he moves it causes more pain and the bones are very achy. Patient also endorses weight loss of 20 lbs in recent months. He called Dr. Clemente who recommended he come to the hospital for evaluation.    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  Cataract of left eye  GERD (gastroesophageal reflux disease)  History of bilateral cataract extraction    SOCIAL HISTORY:    Admitted from:  home, lives alone  Substance abuse history:              Tobacco hx:                  Alcohol hx:              Home Opioid hx:  Confucianism: Yazidism                                    Preferred Language: Martiniquais/English    Surrogate/HCP/Guardian:            Phone#:    FAMILY HISTORY:  No pertinent family history in first degree relatives    Baseline ADLs (prior to admission):    Allergies    penicillin (Rash)    Intolerances    Aspir 81 (Other)    Present Symptoms:   Dyspnea:   Nausea/Vomiting:   Anxiety:  Depressed   Fatigue:  Loss of appetite:   Pain:                                location:          Review of Systems: [All others negative or Unable to obtain due to poor mentation]    MEDICATIONS  (STANDING):  bicalutamide 50 milliGRAM(s) Oral daily  enoxaparin Injectable 40 milliGRAM(s) SubCutaneous daily    MEDICATIONS  (PRN):  acetaminophen   Tablet 650 milliGRAM(s) Oral every 6 hours PRN For Temp greater than 38 C (100.4 F)  acetaminophen   Tablet. 650 milliGRAM(s) Oral every 6 hours PRN Mild Pain (1 - 3)      PHYSICAL EXAM:    Vital Signs Last 24 Hrs  T(C): 36.4 (17 May 2018 08:06), Max: 38.9 (17 May 2018 01:48)  T(F): 97.6 (17 May 2018 08:06), Max: 102.1 (17 May 2018 01:48)  HR: 70 (17 May 2018 08:06) (57 - 88)  BP: 121/57 (17 May 2018 08:06) (114/51 - 149/63)  BP(mean): --  RR: 19 (17 May 2018 08:06) (18 - 19)  SpO2: 99% (17 May 2018 08:06) (96% - 100%)    General: alert  oriented x ____    [lethargic distressed cachexia  nonverbal  unarousable verbal]  Karnofsky Performance Score/Palliative Performance Status Version2:     %    HEENT: normal  dry mouth  ET tube/trach oral lesions:  Lungs: comfortable tachypnea/labored breathing  excessive secretions  CV: normal  tachycardia  GI: normal  distended  tender  incontinent               PEG/NG/OG tube  constipation  last BM:   : normal  incontinent  oliguria/anuria  barcenas  Musculoskeletal: normal  weakness  edema             ambulatory  bedbound/wheelchair bound  Skin: normal  pressure ulcers: stage: edema: other:  Neuro: no deficits cognitive impairment dsyphagia/dysarthria paresis: other:  Oral intake ability: unable/only mouth care [minimal moderate full capability]  Diet: [NPO]    LABS:                        8.7    3.9   )-----------( 96       ( 17 May 2018 05:59 )             27.6     05-17    139  |  107  |  19<H>  ----------------------------<  78  3.8   |  24  |  0.90    Ca    7.8<L>      17 May 2018 05:59  Phos  2.5     05-17  Mg     2.3     05-17    TPro  6.0  /  Alb  2.4<L>  /  TBili  0.6  /  DBili  x   /  AST  60<H>  /  ALT  14  /  AlkPhos  572<H>  05-16        RADIOLOGY & ADDITIONAL STUDIES:    ADVANCE DIRECTIVES:   Advanced Care Planning discussion total time spent: 89M from home, ambulates with cane, no HHA, independant in ADL PMH metastatic prostate cancer with mets to the bone and liver, only on po chemotherapy (Onc Dr. Bowman) who comes to the hospital with generalized body aches and bone pains. Patient has chronic pain but had significant worsening this morning with pain over the entire body and when he moves it causes more pain and the bones are very achy. Patient also endorses weight loss of 20 lbs in recent months. He called Dr. Clemente who recommended he come to the hospital for evaluation.    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  Cataract of left eye  GERD (gastroesophageal reflux disease)  History of bilateral cataract extraction    SOCIAL HISTORY:    Admitted from:  home, lives alone  Substance abuse history:              Tobacco hx:                  Alcohol hx:              Home Opioid hx: None  Hinduism: Holiness                                    Preferred Language: German/English    Surrogate/HCP/Guardian:            Phone#:    FAMILY HISTORY:  No pertinent family history in first degree relatives    Baseline ADLs (prior to admission):    Allergies    penicillin (Rash)    Intolerances    Aspir 81 (Other)    Present Symptoms:   Dyspnea:   Nausea/Vomiting:   Anxiety:  Depressed   Fatigue:  Loss of appetite:   Pain:                                location:          Review of Systems: [All others negative or Unable to obtain due to poor mentation]    MEDICATIONS  (STANDING):  bicalutamide 50 milliGRAM(s) Oral daily  enoxaparin Injectable 40 milliGRAM(s) SubCutaneous daily    MEDICATIONS  (PRN):  acetaminophen   Tablet 650 milliGRAM(s) Oral every 6 hours PRN For Temp greater than 38 C (100.4 F)  acetaminophen   Tablet. 650 milliGRAM(s) Oral every 6 hours PRN Mild Pain (1 - 3)      PHYSICAL EXAM:    Vital Signs Last 24 Hrs  T(C): 36.4 (17 May 2018 08:06), Max: 38.9 (17 May 2018 01:48)  T(F): 97.6 (17 May 2018 08:06), Max: 102.1 (17 May 2018 01:48)  HR: 70 (17 May 2018 08:06) (57 - 88)  BP: 121/57 (17 May 2018 08:06) (114/51 - 149/63)  BP(mean): --  RR: 19 (17 May 2018 08:06) (18 - 19)  SpO2: 99% (17 May 2018 08:06) (96% - 100%)    General: alert  oriented x ____    [lethargic distressed cachexia  nonverbal  unarousable verbal]  Karnofsky Performance Score/Palliative Performance Status Version2:     %    HEENT: normal  dry mouth  ET tube/trach oral lesions:  Lungs: comfortable tachypnea/labored breathing  excessive secretions  CV: normal  tachycardia  GI: normal  distended  tender  incontinent               PEG/NG/OG tube  constipation  last BM:   : normal  incontinent  oliguria/anuria  barcenas  Musculoskeletal: normal  weakness  edema             ambulatory  bedbound/wheelchair bound  Skin: normal  pressure ulcers: stage: edema: other:  Neuro: no deficits cognitive impairment dsyphagia/dysarthria paresis: other:  Oral intake ability: unable/only mouth care [minimal moderate full capability]  Diet: [NPO]    LABS:                        8.7    3.9   )-----------( 96       ( 17 May 2018 05:59 )             27.6     05-17    139  |  107  |  19<H>  ----------------------------<  78  3.8   |  24  |  0.90    Ca    7.8<L>      17 May 2018 05:59  Phos  2.5     05-17  Mg     2.3     05-17    TPro  6.0  /  Alb  2.4<L>  /  TBili  0.6  /  DBili  x   /  AST  60<H>  /  ALT  14  /  AlkPhos  572<H>  05-16        RADIOLOGY & ADDITIONAL STUDIES:    ADVANCE DIRECTIVES:   Advanced Care Planning discussion total time spent: 89M from home, ambulates with cane, no HHA, independant in ADL PMH metastatic prostate cancer with mets to the bone and liver, only on po chemotherapy (Onc Dr. Bowman) who comes to the hospital with generalized body aches and bone pains. Patient has chronic pain but had significant worsening this morning with pain over the entire body and when he moves it causes more pain and the bones are very achy. Patient also endorses weight loss of 20 lbs in recent months. He called Dr. Clemente who recommended he come to the hospital for evaluation.    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  Cataract of left eye  GERD (gastroesophageal reflux disease)  History of bilateral cataract extraction    SOCIAL HISTORY:    Admitted from:  home, lives alone  Substance abuse history:              Tobacco hx: Never                  Alcohol hx:    None          Home Opioid hx: None  Faith: Zoroastrianism                                    Preferred Language: Syriac/English    Surrogate/HCP/Guardian: Teresazoe Duenasmarisela            Phone#: [phone]    FAMILY HISTORY:  No pertinent family history in first degree relatives    Baseline ADLs (prior to admission): Independent, neighbors helped him at times    Allergies: penicillin (Rash)    Intolerances: Aspir 81 (Other)    Present Symptoms:   Dyspnea: Denies  Nausea/Vomiting:   Anxiety:  Depressed   Fatigue:  Loss of appetite:   Pain: c/o back pain                               location:          Review of Systems: [All others negative or Unable to obtain due to poor mentation]    MEDICATIONS  (STANDING):  bicalutamide 50 milliGRAM(s) Oral daily  enoxaparin Injectable 40 milliGRAM(s) SubCutaneous daily    MEDICATIONS  (PRN):  acetaminophen   Tablet 650 milliGRAM(s) Oral every 6 hours PRN For Temp greater than 38 C (100.4 F)  acetaminophen   Tablet. 650 milliGRAM(s) Oral every 6 hours PRN Mild Pain (1 - 3)      PHYSICAL EXAM:    Vital Signs Last 24 Hrs  T(C): 36.4 (17 May 2018 08:06), Max: 38.9 (17 May 2018 01:48)  T(F): 97.6 (17 May 2018 08:06), Max: 102.1 (17 May 2018 01:48)  HR: 70 (17 May 2018 08:06) (57 - 88)  BP: 121/57 (17 May 2018 08:06) (114/51 - 149/63)  BP(mean): --  RR: 19 (17 May 2018 08:06) (18 - 19)  SpO2: 99% (17 May 2018 08:06) (96% - 100%)    General: alert  oriented x ____    [lethargic distressed cachexia  nonverbal  unarousable verbal]  Karnofsky Performance Score/Palliative Performance Status Version2:     %    HEENT: normal  dry mouth  ET tube/trach oral lesions:  Lungs: comfortable tachypnea/labored breathing  excessive secretions  CV: normal  tachycardia  GI: normal  distended  tender  incontinent               PEG/NG/OG tube  constipation  last BM:   : normal  incontinent  oliguria/anuria  barcenas  Musculoskeletal: normal  weakness  edema             ambulatory  bedbound/wheelchair bound  Skin: normal  pressure ulcers: stage: edema: other:  Neuro: no deficits cognitive impairment dsyphagia/dysarthria paresis: other:  Oral intake ability: unable/only mouth care [minimal moderate full capability]  Diet: [NPO]    LABS:                        8.7    3.9   )-----------( 96       ( 17 May 2018 05:59 )             27.6     05-17    139  |  107  |  19<H>  ----------------------------<  78  3.8   |  24  |  0.90    Ca    7.8<L>      17 May 2018 05:59  Phos  2.5     05-17  Mg     2.3     05-17    TPro  6.0  /  Alb  2.4<L>  /  TBili  0.6  /  DBili  x   /  AST  60<H>  /  ALT  14  /  AlkPhos  572<H>  05-16        RADIOLOGY & ADDITIONAL STUDIES:    ADVANCE DIRECTIVES:   Advanced Care Planning discussion total time spent: 89M from home, ambulates with cane, no HHA, independant in ADL PMH metastatic prostate cancer with mets to the bone and liver, only on po chemotherapy (Onc Dr. Bowman) who comes to the hospital with generalized body aches and bone pains. Patient has chronic pain but had significant worsening this morning with pain over the entire body and when he moves it causes more pain and the bones are very achy. Patient also endorses weight loss of 20 lbs in recent months. He called Dr. Clemente who recommended he come to the hospital for evaluation.    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  Cataract of left eye  GERD (gastroesophageal reflux disease)  History of bilateral cataract extraction    SOCIAL HISTORY:    Admitted from:  home, lives alone  Substance abuse history:              Tobacco hx: Never                  Alcohol hx:    None          Home Opioid hx: None  Mu-ism: Christian                                    Preferred Language: Faroese/English    Surrogate/HCP/Guardian: Teresazoe Duenasmarisela            Phone#: [phone]    FAMILY HISTORY:  No pertinent family history in first degree relatives    Baseline ADLs (prior to admission): Independent, neighbors helped him at times    Allergies: penicillin (Rash)    Intolerances: Aspir 81 (Other)    Present Symptoms:   Dyspnea: Denies  Nausea/Vomiting: Denies  Anxiety: Denies  Depressed: Denies   Fatigue: Moderate  Loss of appetite: Moderate  Pain: c/o back pain                               location: lower back, relief with tylenol          Review of Systems: All others negative     MEDICATIONS  (STANDING):  bicalutamide 50 milliGRAM(s) Oral daily  enoxaparin Injectable 40 milliGRAM(s) SubCutaneous daily    MEDICATIONS  (PRN):  acetaminophen   Tablet 650 milliGRAM(s) Oral every 6 hours PRN For Temp greater than 38 C (100.4 F)  acetaminophen   Tablet. 650 milliGRAM(s) Oral every 6 hours PRN Mild Pain (1 - 3)    PHYSICAL EXAM:    Vital Signs Last 24 Hrs  T(C): 36.4 (17 May 2018 08:06), Max: 38.9 (17 May 2018 01:48)  T(F): 97.6 (17 May 2018 08:06), Max: 102.1 (17 May 2018 01:48)  HR: 70 (17 May 2018 08:06) (57 - 88)  BP: 121/57 (17 May 2018 08:06) (114/51 - 149/63)  RR: 19 (17 May 2018 08:06) (18 - 19)  SpO2: 99% (17 May 2018 08:06) (96% - 100%)    General: alert  oriented x _3__ cachexia, verbal  Karnofsky Performance Score/Palliative Performance Status Version2: 30%    HEENT: normal    Lungs: comfortable   CV: normal    GI: normal    : normal    Musculoskeletal: Weakness, ambulatory   Skin: normal    Neuro: no deficits   Oral intake ability: Minimal  Diet: Regular    LABS:                        8.7    3.9   )-----------( 96       ( 17 May 2018 05:59 )             27.6     05-17    139  |  107  |  19<H>  ----------------------------<  78  3.8   |  24  |  0.90    Ca    7.8<L>      17 May 2018 05:59  Phos  2.5     05-17  Mg     2.3     05-17    TPro  6.0  /  Alb  2.4<L>  /  TBili  0.6  /  DBili  x   /  AST  60<H>  /  ALT  14  /  AlkPhos  572<H>  05-16    ADVANCE DIRECTIVES: DNR/DNI  Advanced Care Planning discussion total time spent:  30 Minutes 89M from home, ambulates with cane, no HHA, independant in ADL PMH metastatic prostate cancer with mets to the bone and liver, only on po chemotherapy (Onc Dr. Bowman) who comes to the hospital with generalized body aches and bone pains. Patient has chronic pain but had significant worsening this morning with pain over the entire body and when he moves it causes more pain and the bones are very achy. Patient also endorses weight loss of 20 lbs in recent months. He called Dr. Clemente who recommended he come to the hospital for evaluation.    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  Cataract of left eye  GERD (gastroesophageal reflux disease)  History of bilateral cataract extraction    SOCIAL HISTORY:    Admitted from:  home, lives alone  Substance abuse history:              Tobacco hx: Never                  Alcohol hx:    None          Home Opioid hx: None  Yazidism: Episcopalian                                    Preferred Language: Sami/English    Surrogate/HCP/Guardian: Teresa Jayden  [phone], Nephew Erichaditi Apple [phone], HCP Albert Rebollar [phone], [phone]    FAMILY HISTORY:  No pertinent family history in first degree relatives    Baseline ADLs (prior to admission): Independent, neighbors helped him at times    Allergies: penicillin (Rash)    Intolerances: Aspir 81 (Other)    Present Symptoms:   Dyspnea: Denies  Nausea/Vomiting: Denies  Anxiety: Denies  Depressed: Denies   Fatigue: Moderate  Loss of appetite: Moderate  Pain: c/o back pain                               location: lower back, relief with tylenol          Review of Systems: All others negative     MEDICATIONS  (STANDING):  bicalutamide 50 milliGRAM(s) Oral daily  enoxaparin Injectable 40 milliGRAM(s) SubCutaneous daily    MEDICATIONS  (PRN):  acetaminophen   Tablet 650 milliGRAM(s) Oral every 6 hours PRN For Temp greater than 38 C (100.4 F)  acetaminophen   Tablet. 650 milliGRAM(s) Oral every 6 hours PRN Mild Pain (1 - 3)    PHYSICAL EXAM:    Vital Signs Last 24 Hrs  T(C): 36.4 (17 May 2018 08:06), Max: 38.9 (17 May 2018 01:48)  T(F): 97.6 (17 May 2018 08:06), Max: 102.1 (17 May 2018 01:48)  HR: 70 (17 May 2018 08:06) (57 - 88)  BP: 121/57 (17 May 2018 08:06) (114/51 - 149/63)  RR: 19 (17 May 2018 08:06) (18 - 19)  SpO2: 99% (17 May 2018 08:06) (96% - 100%)    General: alert  oriented x _3__ cachexia, verbal  Karnofsky Performance Score/Palliative Performance Status Version2: 30%    HEENT: normal    Lungs: comfortable   CV: normal    GI: normal    : normal    Musculoskeletal: Weakness, ambulatory   Skin: normal    Neuro: no deficits   Oral intake ability: Minimal  Diet: Regular    LABS:                        8.7    3.9   )-----------( 96       ( 17 May 2018 05:59 )             27.6     05-17    139  |  107  |  19<H>  ----------------------------<  78  3.8   |  24  |  0.90    Ca    7.8<L>      17 May 2018 05:59  Phos  2.5     05-17  Mg     2.3     05-17    TPro  6.0  /  Alb  2.4<L>  /  TBili  0.6  /  DBili  x   /  AST  60<H>  /  ALT  14  /  AlkPhos  572<H>  05-16    ADVANCE DIRECTIVES: DNR/DNI  Advanced Care Planning discussion total time spent:  30 Minutes 89 M from home, ambulates with cane, no HHA, independant in ADL PMH metastatic prostate cancer with mets to the bone and liver, only on po chemotherapy (Onc Dr. Bowman) who comes to the hospital with generalized body aches and bone pains. Patient has chronic pain but had significant worsening this morning with pain over the entire body and when he moves it causes more pain and the bones are very achy. Patient also endorses weight loss of 20 lbs in recent months. He called Dr. Clemente who recommended he come to the hospital for evaluation.    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  Cataract of left eye  GERD (gastroesophageal reflux disease)  History of bilateral cataract extraction    SOCIAL HISTORY:    Admitted from:  home, lives alone  Substance abuse history:              Tobacco hx: Never                  Alcohol hx:    None          Home Opioid hx: None  Islam: Alevism                                    Preferred Language: Turkmen/English    Surrogate/HCP/Guardian: Teresa Carpenter  [phone], Nephew Erich Apple [phone], Primary HCP is , Secondary HCP Eric Simmsdes [phone]    FAMILY HISTORY:  No pertinent family history in first degree relatives    Baseline ADLs (prior to admission): Independent, neighbors helped him at times    Allergies: penicillin (Rash)    Intolerances: Aspir 81 (Other)    Present Symptoms:   Dyspnea: Denies  Nausea/Vomiting: Denies  Anxiety: Denies  Depressed: Denies   Fatigue: Moderate  Loss of appetite: Moderate  Pain: c/o back pain                               location: lower back, relief with tylenol          Review of Systems: All others negative     MEDICATIONS  (STANDING):  bicalutamide 50 milliGRAM(s) Oral daily  enoxaparin Injectable 40 milliGRAM(s) SubCutaneous daily    MEDICATIONS  (PRN):  acetaminophen   Tablet 650 milliGRAM(s) Oral every 6 hours PRN For Temp greater than 38 C (100.4 F)  acetaminophen   Tablet. 650 milliGRAM(s) Oral every 6 hours PRN Mild Pain (1 - 3)    PHYSICAL EXAM:    Vital Signs Last 24 Hrs  T(C): 36.4 (17 May 2018 08:06), Max: 38.9 (17 May 2018 01:48)  T(F): 97.6 (17 May 2018 08:06), Max: 102.1 (17 May 2018 01:48)  HR: 70 (17 May 2018 08:06) (57 - 88)  BP: 121/57 (17 May 2018 08:06) (114/51 - 149/63)  RR: 19 (17 May 2018 08:06) (18 - 19)  SpO2: 99% (17 May 2018 08:06) (96% - 100%)    General: alert  oriented x _3__ cachexia, verbal  Karnofsky Performance Score/Palliative Performance Status Version2: 30%    HEENT: normal    Lungs: comfortable   CV: normal    GI: normal    : normal    Musculoskeletal: Weakness, ambulatory   Skin: normal    Neuro: no deficits   Oral intake ability: Minimal  Diet: Regular    LABS:                        8.7    3.9   )-----------( 96       ( 17 May 2018 05:59 )             27.6     05-    139  |  107  |  19<H>  ----------------------------<  78  3.8   |  24  |  0.90    Ca    7.8<L>      17 May 2018 05:59  Phos  2.5       Mg     2.3         TPro  6.0  /  Alb  2.4<L>  /  TBili  0.6  /  DBili  x   /  AST  60<H>  /  ALT  14  /  AlkPhos  572<H>  16    ADVANCE DIRECTIVES: DNR/DNI  Advanced Care Planning discussion total time spent:  30 Minutes

## 2018-05-17 NOTE — CONSULT NOTE ADULT - PROBLEM SELECTOR RECOMMENDATION 4
Met with patient at bedside. He Met with patient at bedside. His wishes are clear and wants to be DNR/DNI, no aggressive measures; his HCP form is noted in the chart. Met with patient at bedside. His wishes are clear and wants to be DNR/DNI, no aggressive measures; his HCP form is noted in the chart. His primary HCP is . L/M for secondary HCP. Patient is appropriate for residential hospice, however, he lives alone and will need additional help at home. Meeting with him and his nephew Erich tomorrow at 2:00 PM to discuss.

## 2018-05-17 NOTE — CONSULT NOTE ADULT - PROBLEM SELECTOR RECOMMENDATION 9
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  I can not get alex fundation to help either
Metastatic prostate CA, outpatient oncologist Dr. Bowman to f/u; ECOG 3

## 2018-05-17 NOTE — CONSULT NOTE ADULT - PROBLEM SELECTOR RECOMMENDATION 2
Back pain, with relief from tylenol, continue PRN tylenol, if worsening pain, will add opioids; may benefit from Decadron, due to bone mets

## 2018-05-17 NOTE — PROGRESS NOTE ADULT - SUBJECTIVE AND OBJECTIVE BOX
NP Note discussed with  Primary Attending    Patient is a 89y old  Male who presents with a chief complaint of body aches (16 May 2018 10:52)metastatic prostate cancer with symptomatic anemia s/p 2 units prbc. h/h stable. Denies cp/sob. c/o bone generalized pain. Palliative eval in progress.      INTERVAL HPI/OVERNIGHT EVENTS; generalized bone pain, responding to tylenol    MEDICATIONS  (STANDING):  bicalutamide 50 milliGRAM(s) Oral daily  enoxaparin Injectable 40 milliGRAM(s) SubCutaneous daily    MEDICATIONS  (PRN):  acetaminophen   Tablet 650 milliGRAM(s) Oral every 6 hours PRN For Temp greater than 38 C (100.4 F)  acetaminophen   Tablet. 650 milliGRAM(s) Oral every 6 hours PRN Mild Pain (1 - 3)      __________________________________________________  REVIEW OF SYSTEMS:    CONSTITUTIONAL: No fever,   EYES: no acute visual disturbances  NECK: No pain or stiffness  RESPIRATORY: No cough; No shortness of breath  CARDIOVASCULAR: No chest pain, no palpitations  GASTROINTESTINAL: No pain. No nausea or vomiting; No diarrhea   NEUROLOGICAL: No headache or numbness, no tremors  MUSCULOSKELETAL: No joint pain, no muscle pain  GENITOURINARY: no dysuria, no frequency, no hesitancy  PSYCHIATRY: no depression , no anxiety  ALL OTHER  ROS negative        Vital Signs Last 24 Hrs  T(C): 37.1 (17 May 2018 12:57), Max: 38.9 (17 May 2018 01:48)  T(F): 98.7 (17 May 2018 12:57), Max: 102.1 (17 May 2018 01:48)  HR: 82 (17 May 2018 12:57) (57 - 88)  BP: 126/57 (17 May 2018 12:57) (114/51 - 149/63)  BP(mean): --  RR: 19 (17 May 2018 12:57) (18 - 19)  SpO2: 98% (17 May 2018 12:57) (96% - 100%)    ________________________________________________  PHYSICAL EXAM:  GENERAL: NAD, cachectic ill appearing male   HEENT: Normocephalic;  conjunctivae and sclerae clear; moist mucous membranes;   NECK : supple  CHEST/LUNG: Clear to auscultation bilaterally with good air entry   HEART: S1 S2  regular; no murmurs, gallops or rubs  ABDOMEN: Soft, Nontender, Nondistended; Bowel sounds present  EXTREMITIES: no cyanosis; no edema; no calf tenderness  SKIN: warm and dry; no rash  NERVOUS SYSTEM:  Awake and alert; Oriented  to place, person and time ; no new deficits    _________________________________________________  LABS:                        8.7    3.9   )-----------( 96       ( 17 May 2018 05:59 )             27.6     05-17    139  |  107  |  19<H>  ----------------------------<  78  3.8   |  24  |  0.90    Ca    7.8<L>      17 May 2018 05:59  Phos  2.5     05-17  Mg     2.3     05-17    TPro  6.0  /  Alb  2.4<L>  /  TBili  0.6  /  DBili  x   /  AST  60<H>  /  ALT  14  /  AlkPhos  572<H>  05-16    PT/INR - ( 16 May 2018 08:12 )   PT: 17.0 sec;   INR: 1.55 ratio         PTT - ( 16 May 2018 08:12 )  PTT:35.2 sec    CAPILLARY BLOOD GLUCOSE            RADIOLOGY & ADDITIONAL TESTS:    Imaging  Reviewed:  YES    Consultant(s) Notes Reviewed:   YES      Plan of care was discussed with patient all questions and concerns were addressed

## 2018-05-18 NOTE — DISCHARGE NOTE ADULT - HOSPITAL COURSE
89M from home, ambulates with cane, no HHA, independant in ADL PMH metastatic prostate cancer with mets to the bone and liver, only on po chemotherapy (Onc Dr. Bowman) who comes to the body with generalized body aches and bone pains. Patient has chronic pain but had significant worsening this morning with pain over the entire body and when he moves it causes more pain and the bones are very achy. Patient also endorses weight loss of 20 lbs in recent months. He called Dr. Clemente who recommended he come to the hospital for evaluation.     Patent has deteriorated medically over past few weeks, with severe weakness/ anorexia. Known Metastatic and Advanced Prostate Cancer. Requiring frequent blood transfusions.  patient is been discharge to home hospice

## 2018-05-18 NOTE — DISCHARGE NOTE ADULT - MEDICATION SUMMARY - MEDICATIONS TO TAKE
I will START or STAY ON the medications listed below when I get home from the hospital:    predniSONE 20 mg oral tablet  -- 1 tab(s) by mouth once a day  -- Indication: For Prostate cancer metastatic to multiple sites    Tylenol 325 mg oral tablet  -- 2 tab(s) by mouth every 8 hours, As Needed   -- This product contains acetaminophen.  Do not use  with any other product containing acetaminophen to prevent possible liver damage.    -- Indication: For Pain due to neoplasm    bicalutamide 50 mg oral tablet  -- 1 tab(s) by mouth once a day  -- Indication: For Prostate cancer metastatic to multiple sites

## 2018-05-18 NOTE — PROGRESS NOTE ADULT - PROBLEM SELECTOR PLAN 3
Patient ambulates independently, requires some assistance, lives alone, with help from neighbors; did not have aides

## 2018-05-18 NOTE — PROGRESS NOTE ADULT - SUBJECTIVE AND OBJECTIVE BOX
Patient feels better. Pain under control. Wants to go home today.  PE: VS: Stable    Lungs: Clear  Cor: NSRS4  Abd: soft.  Ext: No edema  Neuro A & O x 3    Lab: Hgb >9

## 2018-05-18 NOTE — PROGRESS NOTE ADULT - PROBLEM SELECTOR PLAN 4
Met with patient and close friends Erich Apple and . Patient is adamant about returning home and is agreeable with home hospice services. His close friends and neighbors feel comfortable with the plan. Jesus CARDENAS met with the friends as well and explained that hospice referral was made and they will f/u with consents in the home.  MOLST completed with the patient - DNR/DNI/DNH, no feeding tube, comfort measures only, use antibiotics if necessary. Met with patient and close friends Erich Apple and Gena Villanueva. Patient is adamant about returning home and is agreeable with home hospice services. His close friends and neighbors feel comfortable with the plan. Jesus CARDENAS met with the friends as well and explained that hospice referral was made and they will f/u with consents in the home.  MOLST completed with the patient - DNR/DNI/DNH, no feeding tube, comfort measures only, use antibiotics if necessary.

## 2018-05-18 NOTE — DISCHARGE NOTE ADULT - CARE PLAN
Principal Discharge DX:	Prostate cancer metastatic to multiple sites  Goal:	To treat the underlying medical condition  Assessment and plan of treatment:	continue with home medications  Secondary Diagnosis:	Anemia, unspecified type  Assessment and plan of treatment:	likely Anemia of chronic disease, currently stable  Secondary Diagnosis:	Palliative care encounter  Assessment and plan of treatment:	No Aggressive measures, patient is for home hospice

## 2018-05-18 NOTE — PROGRESS NOTE ADULT - SUBJECTIVE AND OBJECTIVE BOX
OVERNIGHT EVENTS: None, patient feeling well today    Present Symptoms:   Dyspnea: Denies  Nausea/Vomiting: Denies  Anxiety: Denies   Depressed Mood: Denies  Fatigue: moderate  Loss of appetite: Denies  Pain:   Denies                 Review of Systems: All others negative     MEDICATIONS  (STANDING):  bicalutamide 50 milliGRAM(s) Oral daily  enoxaparin Injectable 40 milliGRAM(s) SubCutaneous daily  predniSONE   Tablet 20 milliGRAM(s) Oral daily    MEDICATIONS  (PRN):  acetaminophen   Tablet 650 milliGRAM(s) Oral every 6 hours PRN For Temp greater than 38 C (100.4 F)  acetaminophen   Tablet. 650 milliGRAM(s) Oral every 6 hours PRN Mild Pain (1 - 3)    PHYSICAL EXAM:  Vital Signs Last 24 Hrs  T(C): 36.6 (18 May 2018 05:06), Max: 37.7 (17 May 2018 16:10)  T(F): 97.9 (18 May 2018 05:06), Max: 99.9 (17 May 2018 16:10)  HR: 74 (18 May 2018 05:06) (73 - 82)  BP: 130/76 (18 May 2018 05:06) (124/71 - 148/64)  RR: 18 (18 May 2018 05:06) (18 - 19)  SpO2: 95% (18 May 2018 05:06) (94% - 99%)    General: alert  oriented x _3___    cachexia, verbal  Karnofsky Performance Score/Palliative Performance Status Version2: 40%    HEENT: normal    Lungs: comfortable  CV: normal  GI: normal    : normal  Musculoskeletal: normal, ambulatory    Skin: normal    Neuro: no deficits   Oral intake ability: Moderate  Diet: Regular    LABS:                          9.1    4.1   )-----------( 94       ( 18 May 2018 07:06 )             29.5     05-18    138  |  106  |  18  ----------------------------<  95  4.0   |  20<L>  |  0.73    Ca    7.7<L>      18 May 2018 07:06  Phos  2.5     05-17  Mg     2.3     05-17    Urinalysis Basic - ( 17 May 2018 16:49 )    Color: Yellow / Appearance: Clear / S.005 / pH: x  Gluc: x / Ketone: Negative  / Bili: Negative / Urobili: 1   Blood: x / Protein: 30 mg/dL / Nitrite: Negative   Leuk Esterase: Negative / RBC: 5-10 /HPF / WBC 0-2 /HPF   Sq Epi: x / Non Sq Epi: Few /HPF / Bacteria: Trace /HPF    ADVANCE DIRECTIVES: DNR/DNI  Advanced Care Planning discussion total time spent:  30 Minutes OVERNIGHT EVENTS: None, patient feeling well today. Attending Dr. Clemente says he is medically stable for d/c home.    Present Symptoms:   Dyspnea: Denies  Nausea/Vomiting: Denies  Anxiety: Denies   Depressed Mood: Denies  Fatigue: moderate  Loss of appetite: Denies  Pain:   Denies                 Review of Systems: All others negative     MEDICATIONS  (STANDING):  bicalutamide 50 milliGRAM(s) Oral daily  enoxaparin Injectable 40 milliGRAM(s) SubCutaneous daily  predniSONE   Tablet 20 milliGRAM(s) Oral daily    MEDICATIONS  (PRN):  acetaminophen   Tablet 650 milliGRAM(s) Oral every 6 hours PRN For Temp greater than 38 C (100.4 F)  acetaminophen   Tablet. 650 milliGRAM(s) Oral every 6 hours PRN Mild Pain (1 - 3)    PHYSICAL EXAM:  Vital Signs Last 24 Hrs  T(C): 36.6 (18 May 2018 05:06), Max: 37.7 (17 May 2018 16:10)  T(F): 97.9 (18 May 2018 05:06), Max: 99.9 (17 May 2018 16:10)  HR: 74 (18 May 2018 05:06) (73 - 82)  BP: 130/76 (18 May 2018 05:06) (124/71 - 148/64)  RR: 18 (18 May 2018 05:06) (18 - 19)  SpO2: 95% (18 May 2018 05:06) (94% - 99%)    General: alert  oriented x _3___    cachexia, verbal  Karnofsky Performance Score/Palliative Performance Status Version2: 40%    HEENT: normal    Lungs: comfortable  CV: normal  GI: normal    : normal  Musculoskeletal: normal, ambulatory    Skin: normal    Neuro: no deficits   Oral intake ability: Moderate  Diet: Regular    LABS:                          9.1    4.1   )-----------( 94       ( 18 May 2018 07:06 )             29.5     05-18    138  |  106  |  18  ----------------------------<  95  4.0   |  20<L>  |  0.73    Ca    7.7<L>      18 May 2018 07:06  Phos  2.5     05-17  Mg     2.3     05-17    Urinalysis Basic - ( 17 May 2018 16:49 )    Color: Yellow / Appearance: Clear / S.005 / pH: x  Gluc: x / Ketone: Negative  / Bili: Negative / Urobili: 1   Blood: x / Protein: 30 mg/dL / Nitrite: Negative   Leuk Esterase: Negative / RBC: 5-10 /HPF / WBC 0-2 /HPF   Sq Epi: x / Non Sq Epi: Few /HPF / Bacteria: Trace /HPF    ADVANCE DIRECTIVES: DNR/DNI  Advanced Care Planning discussion total time spent:  30 Minutes

## 2018-05-18 NOTE — DISCHARGE NOTE ADULT - PLAN OF CARE
To treat the underlying medical condition continue with home medications likely Anemia of chronic disease, currently stable No Aggressive measures, patient is for home hospice

## 2018-05-18 NOTE — DISCHARGE NOTE ADULT - PATIENT PORTAL LINK FT
You can access the [facility], offered by [facility], by registering with the following website: [email or web address]

## 2018-05-22 PROBLEM — D64.9 ANEMIA, UNSPECIFIED: Chronic | Status: ACTIVE | Noted: 2018-01-01

## 2018-05-22 NOTE — CONSULT NOTE ADULT - SUBJECTIVE AND OBJECTIVE BOX
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  he was not able to pay for zytiga or xtandi.  he has been losing weight and has more pain.  he still wants to go for further treatment.  he was found to have drooping of face, slurred speech and left side weakness.  CT showed multiple mets.  awake, move all extremities. but speech is still slurred. chest is clear. abd is soft and flat. no leg edema.                        9.1    4.0   )-----------( 84       ( 22 May 2018 09:52 )             30.7   05-22    140  |  107  |  22<H>  ----------------------------<  83  4.1   |  24  |  0.93    Ca    8.1<L>      22 May 2018 09< from: CT Brain Stroke Protocol (05.22.18 @ 09:42) >    INTERPRETATION:  CLINICAL STATEMENT: Left-sided weakness    TECHNIQUE: CT of the head was performed without IV contrast.    COMPARISON: CT head 5/23/2016    FINDINGS:  There is moderate diffuse parenchymal volume loss. There are areas of low   attenuation in the periventricular white matter likely related to mild   chronic microvascular ischemic changes.    Multiple new hyperdense dural masses are noted bilaterally largest   measuring up to 6.9 x 2.8 cm in the right temporal region with mass   effect on the adjacent brain and causing vasogenic edema in the right   temporal lobe extending to the right parietal lobe. There is mass effect   on the right temporal horn.    Right to left midline shift of 3 mm noted. There is no acute territorial   infarct.    Nonspecific prominence bilateral optic nerve sheaths unchanged partial   empty sella.    The cranium is intact. Multiple areas of sclerosis and lytic areas noted.     Mucosal thickening sphenoid sinus. Partial opacification left mastoid air   cells.    IMPRESSION:  Multiple new hyperdense dural masses with heterogeneous calvarium as   described above most compatible with metastases. MRI brain with contrast   can be obtained as clinically warranted    Dr. Andre notified 5/22/2018 at 9:50 AM    < end of copied text >  :52    TPro  6.4  /  Alb  2.6<L>  /  TBili  0.8  /  DBili  x   /  AST  80<H>  /  ALT  18  /  AlkPhos  585<H>  05-22

## 2018-05-22 NOTE — ED PROVIDER NOTE - OBJECTIVE STATEMENT
89 y/o M pt with PMHx of metastatic prostate CA and h/o chronic anemia requiring blood transfusion BIB EMS for concern of L sided weakness noted upon wakening up x today. Per daughter, Penelope, last nml was last night. Pt was recently discharged from hospital 4 days ago where he was treated for pain and considered for hospice.

## 2018-05-22 NOTE — CONSULT NOTE ADULT - PROBLEM SELECTOR RECOMMENDATION 4
Spoke with patient and friend Spoke with patient and friend Gena Villanueva at bedside. They want to speak with Dr. Bowman regarding treatment vs hospice. Once they talk to Dr. Bowman, social work will likely be needed to facilitate d/c planning, as patient is having more problems taking care of himself at home. He confirmed again that he is DNR/DNI. HERNANDEZ completed last admission.

## 2018-05-22 NOTE — H&P ADULT - NSHPLABSRESULTS_GEN_ALL_CORE
9.1    4.0   )-----------( 84       ( 22 May 2018 09:52 )             30.7           140  |  107  |  22<H>  ----------------------------<  83  4.1   |  24  |  0.93    Ca    8.1<L>      22 May 2018 09:52    TPro  6.4  /  Alb  2.6<L>  /  TBili  0.8  /  DBili  x   /  AST  80<H>  /  ALT  18  /  AlkPhos  585<H>                Urinalysis Basic - ( 22 May 2018 11:22 )    Color: Yellow / Appearance: Clear / S.015 / pH: x  Gluc: x / Ketone: Negative  / Bili: Negative / Urobili: Negative   Blood: x / Protein: 15 / Nitrite: Negative   Leuk Esterase: Negative / RBC: 2-5 /HPF / WBC 0-2 /HPF   Sq Epi: x / Non Sq Epi: Occasional /HPF / Bacteria: Trace /HPF        PT/INR - ( 22 May 2018 09:52 )   PT: 16.3 sec;   INR: 1.48 ratio         PTT - ( 22 May 2018 09:52 )  PTT:32.3 sec    Lactate Trend   @ 10:30 Lactate:1.9       CARDIAC MARKERS ( 22 May 2018 09:52 )  <0.015 ng/mL / x     / 471 U/L / x     / <1.0 ng/mL        CAPILLARY BLOOD GLUCOSE      POCT Blood Glucose.: 116 mg/dL (22 May 2018 10:53)        Culture Results:   <10,000 CFU/ml  Normal Urogenital megan present ( @ 23:51)

## 2018-05-22 NOTE — H&P ADULT - NSHPPHYSICALEXAM_GEN_ALL_CORE
Vital Signs Last 24 Hrs  T(C): 36.9 (22 May 2018 10:37), Max: 38.3 (22 May 2018 09:49)  T(F): 98.4 (22 May 2018 10:37), Max: 100.9 (22 May 2018 09:49)  HR: 98 (22 May 2018 13:49) (83 - 114)  BP: 110/71 (22 May 2018 13:49) (110/71 - 150/90)  BP(mean): --  RR: 17 (22 May 2018 13:49) (17 - 20)  SpO2: 100% (22 May 2018 13:49) (98% - 100%)    GENERAL: NAD  HEAD:  Atraumatic, Normocephalic  EYES: EOMI, PERRLA, conjunctiva and sclera clear  ENMT: Moist mucous membranes  NECK: Supple  NERVOUS SYSTEM:  Alert, Strength 5/5 on upper and lower extremity, facial droop improved, incomprehensible speech   CHEST/LUNG: Clear to auscultation bilaterally; No rales, rhonchi, wheezing, or rubs  HEART: Regular rate and rhythm; No murmurs, rubs, or gallops  ABDOMEN: Soft, Nontender, Nondistended; Bowel sounds present  EXTREMITIES:  2+ Peripheral Pulses, No clubbing, cyanosis, or edema

## 2018-05-22 NOTE — ED ADULT NURSE NOTE - ED STAT RN HANDOFF DETAILS
Pt remains alert and verbally responsive stable hemodynamically  endorsed to Loyd BATEMAN safety maintained

## 2018-05-22 NOTE — CONSULT NOTE ADULT - SUBJECTIVE AND OBJECTIVE BOX
HPI: 89 y/o M pt with PMHx of metastatic prostate CA and h/o chronic anemia requiring blood transfusion BIB EMS for concern of L sided weakness noted upon wakening up x today. BIB EMS by notification as code stroke Pt seen normal at 11pm yesterday, on arrival F/S  63 awake mildly anxious L face drop and twitching /L sided weakness and slurred speech noted CT head completed, IVpush Dextrose 50%given. CT of head revealed brain mets. Patient know to me from recent admission last week. Plan was to d/c to home hospice, however, hospice contacted patient and family and they said they wanted to continue chemotherapy at that time, so he wasn't admitted to hospice.      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  Cataract of left eye  GERD (gastroesophageal reflux disease)  History of bilateral cataract extraction    SOCIAL HISTORY:    Admitted from:  home assisted living [facility]   Substance abuse history:              Tobacco hx: Unknown                 Alcohol hx:  Unknown            Home Opioid hx: None  Rastafarian:  Lutheran                                  Preferred Language: English/Wolof    Surrogate/HCP/Guardian: Erich Apple (Friend)           Phone#: [phone]    FAMILY HISTORY:  No pertinent family history in first degree relatives    Baseline ADLs (prior to admission): Requiring some assistance with ADLs    Allergies: penicillin (Rash)    Intolerances: Aspir 81 (Other)    Present Symptoms:   Dyspnea: Denies  Nausea/Vomiting: Denies  Anxiety: Denies  Depressed: Denies   Fatigue: Denies  Loss of appetite: Denies  Pain:  Denies                                   Review of Systems: All others negative    PHYSICAL EXAM:    Vital Signs Last 24 Hrs  T(C): 36.9 (22 May 2018 10:37), Max: 38.3 (22 May 2018 09:49)  T(F): 98.4 (22 May 2018 10:37), Max: 100.9 (22 May 2018 09:49)  HR: 98 (22 May 2018 13:49) (83 - 114)  BP: 110/71 (22 May 2018 13:49) (110/71 - 150/90)  RR: 17 (22 May 2018 13:49) (17 - 20)  SpO2: 100% (22 May 2018 13:49) (98% - 100%)    General: alert  oriented x _3___  verbal  Karnofsky Performance Score/Palliative Performance Status Version2:  30%    HEENT:   Lungs: comfortable tachypnea/labored breathing  excessive secretions  CV: normal  tachycardia  GI: normal  distended  tender  incontinent               PEG/NG/OG tube  constipation  last BM:   : normal  incontinent  oliguria/anuria  barcenas  Musculoskeletal: normal  weakness  edema             ambulatory  bedbound/wheelchair bound  Skin: normal  pressure ulcers: stage: edema: other:  Neuro: no deficits cognitive impairment dsyphagia/dysarthria paresis: other:  Oral intake ability: unable/only mouth care [minimal moderate full capability]  Diet: [NPO]    LABS:                        9.1    4.0   )-----------( 84       ( 22 May 2018 09:52 )             30.7         140  |  107  |  22<H>  ----------------------------<  83  4.1   |  24  |  0.93    Ca    8.1<L>      22 May 2018 09:52    TPro  6.4  /  Alb  2.6<L>  /  TBili  0.8  /  DBili  x   /  AST  80<H>  /  ALT  18  /  AlkPhos  585<H>      Urinalysis Basic - ( 22 May 2018 11:22 )    Color: Yellow / Appearance: Clear / S.015 / pH: x  Gluc: x / Ketone: Negative  / Bili: Negative / Urobili: Negative   Blood: x / Protein: 15 / Nitrite: Negative   Leuk Esterase: Negative / RBC: 2-5 /HPF / WBC 0-2 /HPF   Sq Epi: x / Non Sq Epi: Occasional /HPF / Bacteria: Trace /HPF    RADIOLOGY & ADDITIONAL STUDIES: < from: CT Brain Stroke Protocol (18 @ 09:42) >  IMPRESSION:  Multiple new hyperdense dural masses with heterogeneous calvarium as   described above most compatible with metastases. MRI brain with contrast   can be obtained as clinically warranted    < end of copied text >      ADVANCE DIRECTIVES:   Advanced Care Planning discussion total time spent: HPI: 89 y/o M pt with PMHx of metastatic prostate CA and h/o chronic anemia requiring blood transfusion BIB EMS for concern of L sided weakness noted upon wakening up x today. BIB EMS by notification as code stroke Pt seen normal at 11pm yesterday, on arrival F/S  63 awake mildly anxious L face drop and twitching /L sided weakness and slurred speech noted CT head completed, IVpush Dextrose 50%given. CT of head revealed brain mets. Patient know to me from recent admission last week. Plan was to d/c to home hospice, however, hospice contacted patient and family and they said they wanted to continue chemotherapy at that time, so he wasn't admitted to hospice.      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  Cataract of left eye  GERD (gastroesophageal reflux disease)  History of bilateral cataract extraction    SOCIAL HISTORY:    Admitted from:  home assisted living [facility]   Substance abuse history:              Tobacco hx: Unknown                 Alcohol hx:  Unknown            Home Opioid hx: None  Restorationist:  Temple                                  Preferred Language: English/Polish    Surrogate/HCP/Guardian: Erich Apple (Friend)           Phone#: [phone]    FAMILY HISTORY:  No pertinent family history in first degree relatives    Baseline ADLs (prior to admission): Requiring some assistance with ADLs    Allergies: penicillin (Rash)    Intolerances: Aspir 81 (Other)    Present Symptoms:   Dyspnea: Denies  Nausea/Vomiting: Denies  Anxiety: Denies  Depressed: Denies   Fatigue: Denies  Loss of appetite: Denies  Pain:  Denies                                   Review of Systems: All others negative    PHYSICAL EXAM:    Vital Signs Last 24 Hrs  T(C): 36.9 (22 May 2018 10:37), Max: 38.3 (22 May 2018 09:49)  T(F): 98.4 (22 May 2018 10:37), Max: 100.9 (22 May 2018 09:49)  HR: 98 (22 May 2018 13:49) (83 - 114)  BP: 110/71 (22 May 2018 13:49) (110/71 - 150/90)  RR: 17 (22 May 2018 13:49) (17 - 20)  SpO2: 100% (22 May 2018 13:49) (98% - 100%)    General: alert  oriented x _3___  verbal  Karnofsky Performance Score/Palliative Performance Status Version2:  30%    HEENT:   Lungs: comfortable  CV: normal  GI: normal    : normal   Musculoskeletal:  weakness    Skin: Normal  Neuro: Minor facial paralysis (flattened nasolabial fold, asymmetry on smiling), slight slurring of speech  Oral intake ability: Moderate  Diet: NPO    LABS:                        9.1    4.0   )-----------( 84       ( 22 May 2018 09:52 )             30.7         140  |  107  |  22<H>  ----------------------------<  83  4.1   |  24  |  0.93    Ca    8.1<L>      22 May 2018 09:52    TPro  6.4  /  Alb  2.6<L>  /  TBili  0.8  /  DBili  x   /  AST  80<H>  /  ALT  18  /  AlkPhos  585<H>      Urinalysis Basic - ( 22 May 2018 11:22 )    Color: Yellow / Appearance: Clear / S.015 / pH: x  Gluc: x / Ketone: Negative  / Bili: Negative / Urobili: Negative   Blood: x / Protein: 15 / Nitrite: Negative   Leuk Esterase: Negative / RBC: 2-5 /HPF / WBC 0-2 /HPF   Sq Epi: x / Non Sq Epi: Occasional /HPF / Bacteria: Trace /HPF    RADIOLOGY & ADDITIONAL STUDIES: < from: CT Brain Stroke Protocol (18 @ 09:42) >  IMPRESSION:  Multiple new hyperdense dural masses with heterogeneous calvarium as   described above most compatible with metastases. MRI brain with contrast   can be obtained as clinically warranted    < end of copied text >    ADVANCE DIRECTIVES: DNR/DNI

## 2018-05-22 NOTE — CONSULT NOTE ADULT - CONSULT REASON
prostate < from: CT Brain Stroke Protocol (05.22.18 @ 09:42) >    INTERPRETATION:  CLINICAL STATEMENT: Left-sided weakness    TECHNIQUE: CT of the head was performed without IV contrast.    COMPARISON: CT head 5/23/2016    FINDINGS:  There is moderate diffuse parenchymal volume loss. There are areas of low   attenuation in the periventricular white matter likely related to mild   chronic microvascular ischemic changes.    Multiple new hyperdense dural masses are noted bilaterally largest   measuring up to 6.9 x 2.8 cm in the right temporal region with mass   effect on the adjacent brain and causing vasogenic edema in the right   temporal lobe extending to the right parietal lobe. There is mass effect   on the right temporal horn.    Right to left midline shift of 3 mm noted. There is no acute territorial   infarct.    Nonspecific prominence bilateral optic nerve sheaths unchanged partial   empty sella.    The cranium is intact. Multiple areas of sclerosis and lytic areas noted.     Mucosal thickening sphenoid sinus. Partial opacification left mastoid air   cells.    IMPRESSION:  Multiple new hyperdense dural masses with heterogeneous calvarium as   described above most compatible with metastases. MRI brain with contrast   can be obtained as clinically warranted    Dr. Andre notified 5/22/2018 at 9:50 AM    < end of copied text >

## 2018-05-22 NOTE — CONSULT NOTE ADULT - PROBLEM SELECTOR RECOMMENDATION 9
Now found to have brain mets; plan was for d/c to home hospice last week, however, patient and family decided they wanted to continue chemo, so hospice did not take the case; spoke with oncologist Dr. Bowman and he is recommending hospice at this time and to d/c chemotherapy; I spoke with patient about this recommendation and Dr. Bowman will speak with him as well

## 2018-05-22 NOTE — PATIENT PROFILE ADULT. - LANGUAGE ASSISTANCE NEEDED
also speaks English. Speech is slurred/No-Patient/Caregiver offered and refused free interpretation services.

## 2018-05-22 NOTE — ED PROVIDER NOTE - CARE PLAN
Principal Discharge DX:	Altered mental status  Secondary Diagnosis:	Brain metastases  Secondary Diagnosis:	Fever

## 2018-05-22 NOTE — H&P ADULT - NSHPREVIEWOFSYSTEMS_GEN_ALL_CORE
REVIEW OF SYSTEMS: Limited due to clinical condition  CONSTITUTIONAL: No fever,  EYES: No eye pain  ENMT:  No sinus or throat pain  RESPIRATORY: No cough, No shortness of breath  CARDIOVASCULAR: No chest pain  GASTROINTESTINAL: No abdominal or epigastric pain. No nausea, vomiting; No diarrhea or constipation  GENITOURINARY: No dysuria, frequency, hematuria, or incontinence  NEUROLOGICAL: left sided weakness  ENDOCRINE: No heat or cold intolerance; No hair loss  MUSCULOSKELETAL: No joint pain or swelling; No muscle, back, or extremity pain

## 2018-05-22 NOTE — H&P ADULT - HISTORY OF PRESENT ILLNESS
89 y/o M from home, ambulates with cane, no HHA with PMHx of metastatic prostate cancer with metasatasis (on PO chemotherapy) came in with complaint of left sided weakness. Patient's neighbor checks on the patient multiple times a day. Patient was last seen normal at 11pm last night. However this morning when she went to visit him she noticed left sided facial droop, slurred speech and worsening weakness. She said the facial droop has improved, however the speech is more incomprehensible. Patient denies any other complaints.    Patient was recently discharged from [facility] on 05/18. Plan was for home hospice, however when the palliative team contacted the patient and the family, they said they want to continue with chemotherapy for now. Therefore the patient was not hospice yet. Today in the ED patient was code stroke. CT head shows new metastatic lesions. Palliative consulted.    SH: Denies smoking, alcohol or illicit drug use. Lives by himself. No HHA. Ambulates with walker/wheelchair  NKDA

## 2018-05-22 NOTE — H&P ADULT - ASSESSMENT
89 y/o M from home, ambulates with cane, no HHA with PMHx of metastatic prostate cancer with metasatasis (on PO chemotherapy) came in with complaint of left sided weakness.

## 2018-05-22 NOTE — ED PROVIDER NOTE - CRITICAL CARE PROVIDED
interpretation of diagnostic studies/consultation with other physicians/additional history taking/consult w/ pt's family directly relating to pts condition/documentation/direct patient care (not related to procedure)

## 2018-05-22 NOTE — H&P ADULT - PROBLEM SELECTOR PLAN 3
[] Previous VTE                                                3  [] Thrombophilia                                             2  [] Lower limb paralysis                                   2    [X] Current Cancer                                             2   [X] Immobilization > 24 hrs                              1  [] ICU/CCU stay > 24 hours                             1  [X] Age > 60                                                         1    IMPROVE VTE Score: 4  Lovenox (Primary team to discontinue lovenox when platelets<50)

## 2018-05-22 NOTE — ED ADULT NURSE NOTE - OBJECTIVE STATEMENT
BIB EMS by notification as code stroke Pt seen normal at 11pm yesterday, on arrival F/S  63 awake mildly anxious L face drop and twitching /L sided weakness and slurred speech noted CT head completed, IVpush Dextrose 50%given

## 2018-05-22 NOTE — PATIENT PROFILE ADULT. - FALL HARM RISK
other/bones(Osteoporosis,prev fx,steroid use,metastatic bone ca/age(85 years old or older)/fatigue. anemia, weakness

## 2018-05-22 NOTE — H&P ADULT - PROBLEM SELECTOR PLAN 1
CT head: Multiple new hyperdense dural masses with heterogeneous calvarium most compatible with metastases.   Weakness and facial droop improved  - Will start decadron 4mg BID  - Dr Bowman consulted  - Palliative consulted  - Neuro checks

## 2018-05-22 NOTE — ED PROVIDER NOTE - MEDICAL DECISION MAKING DETAILS
Stroke like symptoms in elderly pt with metastatic prostrate dz. Pt is out of the window for tPa; concern for metastatic to brain. Will CT head, labs, FS, and reassess.

## 2018-05-22 NOTE — CONSULT NOTE ADULT - PROBLEM SELECTOR RECOMMENDATION 3
Patient lives alone, is now having a harder time taking care of himself, will likely need residential hospice

## 2018-05-22 NOTE — CONSULT NOTE ADULT - PROBLEM SELECTOR RECOMMENDATION 9
brain mets,  start decadron 4 mg bid  he still wants to do treatment  I am not sure zytiga or xtandi can work for brain mets, but zytiga does penetrate BBB.  will discuss with palliative team abd .

## 2018-05-23 NOTE — PROGRESS NOTE ADULT - PROBLEM SELECTOR PLAN 4
Met with patient and family/friends at bedside. He is agreeable with home hospice services. Need social work help to coordinate, as he will likely need 24/7 private hire help. Cousin's wife Paula Rebollar (cell [phone], home [phone]) is involved and willing to help coordinate. Friend Gena is at bedside and very overwhelmed with the idea of taking him home. SW consult called to assist in coordinating.

## 2018-05-23 NOTE — PROGRESS NOTE ADULT - PROBLEM SELECTOR PLAN 3
Patient lives alone, ECOG 2-3, weak, requiring assistance when ambulating, will likely need 24/7 care at home

## 2018-05-25 NOTE — DISCHARGE NOTE ADULT - PATIENT PORTAL LINK FT
You can access the [facility] Patient Portal, offered by [facility], by registering with the following website: [email or web address]

## 2018-05-25 NOTE — DISCHARGE NOTE ADULT - MEDICATION SUMMARY - MEDICATIONS TO STOP TAKING
I will STOP taking the medications listed below when I get home from the hospital:  None I will STOP taking the medications listed below when I get home from the hospital:    bicalutamide 50 mg oral tablet  -- 1 tab(s) by mouth once a day    predniSONE 20 mg oral tablet  -- 1 tab(s) by mouth once a day

## 2018-05-25 NOTE — DISCHARGE NOTE ADULT - MEDICATION SUMMARY - MEDICATIONS TO TAKE
I will START or STAY ON the medications listed below when I get home from the hospital:    bicalutamide 50 mg oral tablet  -- 1 tab(s) by mouth once a day  -- Indication: For chemotherapy I will START or STAY ON the medications listed below when I get home from the hospital:    dexamethasone  -- 4 milligram(s) injectable 2 times a day for brain metastasis  -- Indication: For For brain metastasis    bicalutamide 50 mg oral tablet  -- 1 tab(s) by mouth once a day  -- Indication: For chemotherapy    Protonix 40 mg oral delayed release tablet  -- 1 tab(s) by mouth once a day for Ulcer prophylaxis as patient is on steroid  -- Indication: For Stress Ulcer prophlaxis I will START or STAY ON the medications listed below when I get home from the hospital:    dexamethasone  -- 4 milligram(s) injectable 2 times a day for brain metastasis  -- Indication: For For brain metastasis    Protonix 40 mg oral delayed release tablet  -- 1 tab(s) by mouth once a day for Ulcer prophylaxis as patient is on steroid  -- Indication: For Stress Ulcer prophlaxis

## 2018-05-25 NOTE — DISCHARGE NOTE ADULT - HOSPITAL COURSE
89 y/o M from home, ambulates with cane, no HHA with PMHx of metastatic prostate cancer with metasatasis (on PO chemotherapy) came in with complaint of left sided weakness. Patient's neighbor checked on the patient multiple times a day, then noticed left sided facial droop, slurred speech and worsening weakness. Neighbour said the facial droop has improved, however the speech was still more incomprehensible. Patient denied any other complaints.    Patient was recently discharged from [facility] on 05/18. Plan was for home hospice, however when the palliative team contacted the patient and the family, they initially said they wanted  to continue with chemotherapy . Therefore the patient was initially not hospice. In the ED patient was code stroke. CT head shows new metastatic lesions.     On medical floor, Patient's facial droop, weakness and slurred speech has improved after IV decadron treatment. Patient was evaluated by Dr. Bowman, recommended that Patient needs Whole Brain Radiotherapy which patient refused, Explained patient that chemotherapy may not helped so much as cancer has already spread. Patient and family finally decided hospice care after palliative was consulted. Patient initially wanted home with home hospice, but then decided Nursing home with LTC placement with hospice.       Decision was made to discharge. Discussed with attending  Please refer to patient's complete medical chart with documents for a full hospital course, for this is only a brief summary.

## 2018-05-25 NOTE — DISCHARGE NOTE ADULT - PLAN OF CARE
To manage conservatively Patient came with complain Patient came with complain of weakness of left side of body and facial droop which is resolved after getting decadron, CT scan shows brain metastases, was started on decadron 4 mg as per Dr. Bowman recommendation, Dr. Bowman recommended Chemotherapy will not have any benefit due to metastasis. Options of whole brain radiotherapy and chemotherapy was discussed, palliative was also consulted, Patient and family decided finally hospice in nursing home. Continue with hospice Patient came with complain of weakness of left side of body and facial droop which is resolved after getting decadron, CT scan shows brain metastases, was started on decadron 4 mg as per Dr. Bowman recommendation, Dr. Bowman recommended Chemotherapy will not have any benefit due to metastasis. Options of whole brain radiotherapy and chemotherapy was discussed, palliative was also consulted, Patient and family decided finally hospice in nursing home. Continue with decadron, protonix for brain metastasis

## 2018-05-25 NOTE — DISCHARGE NOTE ADULT - CARE PLAN
Principal Discharge DX:	Prostate cancer metastatic to multiple sites  Goal:	To manage conservatively  Assessment and plan of treatment:	Patient came with complain  Secondary Diagnosis:	Debility Principal Discharge DX:	Prostate cancer metastatic to multiple sites  Goal:	To manage conservatively  Assessment and plan of treatment:	Patient came with complain of weakness of left side of body and facial droop which is resolved after getting decadron, CT scan shows brain metastases, was started on decadron 4 mg as per Dr. Bowman recommendation, Dr. Bowman recommended Chemotherapy will not have any benefit due to metastasis. Options of whole brain radiotherapy and chemotherapy was discussed, palliative was also consulted, Patient and family decided finally hospice in nursing home.  Secondary Diagnosis:	Debility  Assessment and plan of treatment:	Continue with hospice Principal Discharge DX:	Prostate cancer metastatic to multiple sites  Goal:	To manage conservatively  Assessment and plan of treatment:	Patient came with complain of weakness of left side of body and facial droop which is resolved after getting decadron, CT scan shows brain metastases, was started on decadron 4 mg as per Dr. Bowman recommendation, Dr. Bowman recommended Chemotherapy will not have any benefit due to metastasis. Options of whole brain radiotherapy and chemotherapy was discussed, palliative was also consulted, Patient and family decided finally hospice in nursing home. Continue with decadron, protonix for brain metastasis  Secondary Diagnosis:	Debility  Assessment and plan of treatment:	Continue with hospice

## 2018-05-26 NOTE — PROGRESS NOTE ADULT - PROBLEM SELECTOR PLAN 3
[] Previous VTE                                                3  [] Thrombophilia                                             2  [] Lower limb paralysis                                   2    [X] Current Cancer                                             2   [X] Immobilization > 24 hrs                              1  [] ICU/CCU stay > 24 hours                             1  [X] Age > 60                                                         1    IMPROVE VTE Score: 4  Lovenox (Primary team to discontinue lovenox when platelets<50) [] Previous VTE                                                3  [] Thrombophilia                                             2  [] Lower limb paralysis                                   2    [X] Current Cancer                                             2   [X] Immobilization > 24 hrs                              1  [] ICU/CCU stay > 24 hours                             1  [X] Age > 60                                                         1    IMPROVE VTE Score: 4  Lovenox

## 2018-05-28 NOTE — PROGRESS NOTE ADULT - PROBLEM SELECTOR PLAN 2
- c/w casodex  - Dr Bowman aware  - Palliative on board patient is towards dispo planning to home with home hospice, family is looking for 24 hr home with home health aide  - DNR/DNI/comfort measures/ Abx
No pain for now; can use PRN tylenol or Percocet, if pain worsens
- c/w casodex  - Dr Bowman aware  - Palliative on board  - DNR/DNI/comfort measures/ Abx
- c/w casodex  - Dr Bowman aware  - Palliative on board  - DNR/DNI/comfort measures/ Abx
- c/w casodex  - Dr Bowman aware  - Palliative on board patient is towards dispo planning to home with home hospice, family is looking for 24 hr home with home health aide  - DNR/DNI/comfort measures/ Abx
- c/w casodex  - Dr Bowman aware  - Palliative was consulted , patient and family decided home with home hospice, family was looking for 24 hr home with home health aide but then changed the decision to LTC and Nursing home with hospice care,    and  on board for dispo planning  - DNR/DNI/comfort measures/ Abx

## 2018-05-28 NOTE — PROGRESS NOTE ADULT - ASSESSMENT
89 y/o M from home, ambulates with cane, no HHA with PMHx of metastatic prostate cancer with metasatasis (on PO chemotherapy) came in with complaint of left sided weakness.
Imp: Metastatic Prostate Cancer with Bone and Brain Mets, Advanced. Evaluated by Pallitive Care. Considering Hospice care.
Metastatic Prostate cancer with Brain/Bone Mets.

## 2018-05-28 NOTE — PROGRESS NOTE ADULT - PROBLEM SELECTOR PLAN 1
CT head: Multiple new hyperdense dural masses with heterogeneous calvarium most compatible with metastases.   Weakness and facial droop improved  - c/w decadron 4mg BID  - Dr Bowman consulted  - Palliative consulted  ; patient and HCP decided home with home hospice, currently on dispo planning
ECOG 2-3, now with newly found brain mets; patient does not want radiation. Spoke with patient and family with Dr. Bowman and Dr. Graham and he understands his poor prognosis and is agreeable with stopping chemotherapy, as it will no longer; benefit him; continue Decadron
CT head: Multiple new hyperdense dural masses with heterogeneous calvarium most compatible with metastases.   Weakness and facial droop improved  - c/w decadron 4mg BID  - Dr Bowman consulted  - Palliative consulted  ; patient and HCP decided home with home hospice
CT head: Multiple new hyperdense dural masses with heterogeneous calvarium most compatible with metastases.   Weakness and facial droop improved  - c/w decadron 4mg BID  - Dr Bowman consulted  - Palliative consulted  ; patient and HCP decided home with home hospice, currently on dispo planning
CT head: Multiple new hyperdense dural masses with heterogeneous calvarium most compatible with metastases.   Weakness and facial droop improved  - c/w decadron 4mg BID  - Dr Bowman consulted  - Palliative consulted  ; patient and HCP decided home with home hospice, currently on dispo planning
CT head: Multiple new hyperdense dural masses with heterogeneous calvarium most compatible with metastases.   Weakness and facial droop improved  - c/w decadron 4mg BID  - Dr Bowman consulted  - Palliative consulted  ; patient and HCP initially decided home with home hospice but now patient has changed his decision and wants to go Nursing home,    and  on board for Dispo planning

## 2018-05-28 NOTE — PROGRESS NOTE ADULT - PROVIDER SPECIALTY LIST ADULT
Heme/Onc
Internal Medicine
Palliative Care
Internal Medicine

## 2018-05-28 NOTE — PROGRESS NOTE ADULT - PROBLEM SELECTOR PROBLEM 1
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
Weakness of left side of body

## 2018-05-28 NOTE — PROGRESS NOTE ADULT - SUBJECTIVE AND OBJECTIVE BOX
Patient is a 89y old  Male who presents with a chief complaint of left sided weakness (22 May 2018 14:55)      INTERVAL HPI/OVERNIGHT EVENTS: No major overnight events. Patient seen at bedside, denies any complains    MEDICATIONS  (STANDING):  dexamethasone  Injectable 4 milliGRAM(s) IV Push two times a day  enoxaparin Injectable 40 milliGRAM(s) SubCutaneous daily    MEDICATIONS  (PRN):  acetaminophen   Tablet. 650 milliGRAM(s) Oral every 6 hours PRN Mild Pain (1 - 3)      Allergies    penicillin (Rash)    Intolerances    Aspir 81 (Other)      REVIEW OF SYSTEMS:  CONSTITUTIONAL: No fever, weight loss, or fatigue  RESPIRATORY: No cough, wheezing, chills or hemoptysis; No shortness of breath  CARDIOVASCULAR: No chest pain, palpitations, dizziness, or leg swelling  GASTROINTESTINAL: No abdominal or epigastric pain. No nausea, vomiting, or hematemesis; No diarrhea or constipation. No melena or hematochezia.  NEUROLOGICAL: No headaches, memory loss, loss of strength, numbness, or tremors  SKIN: No itching, burning, rashes, or lesions     Vital Signs Last 24 Hrs  T(C): 36.8 (25 May 2018 12:57), Max: 36.8 (25 May 2018 12:57)  T(F): 98.2 (25 May 2018 12:57), Max: 98.2 (25 May 2018 12:57)  HR: 61 (25 May 2018 12:57) (59 - 70)  BP: 134/61 (25 May 2018 12:57) (124/56 - 148/91)  BP(mean): --  RR: 17 (25 May 2018 12:57) (17 - 18)  SpO2: 100% (25 May 2018 12:57) (97% - 100%)    PHYSICAL EXAM:  GENERAL: NAD,   HEAD:  Atraumatic, Normocephalic  EYES: EOMI, PERRLA, conjunctiva and sclera clear  NECK: Supple, No JVD, Normal thyroid  CHEST/LUNG: clear to auscultation, Clear to percussion bilaterally; No rales, rhonchi, wheezing, or rubs  HEART: Regular rate and rhythm; No murmurs, rubs, or gallops  ABDOMEN: Soft, Nontender, Nondistended; Bowel sounds present  NERVOUS SYSTEM:  Alert & Oriented X3,   Alert, Strength 5/5 on upper and lower extremity, facial droop improved,   EXTREMITIES:  2+ Peripheral Pulses, No clubbing, cyanosis, or edema  SKIN; warm,dry    LABS:                        8.9    4.3   )-----------( 112      ( 24 May 2018 06:38 )             29.1     05-24    141  |  107  |  55<H>  ----------------------------<  140<H>  4.0   |  21<L>  |  1.11    Ca    8.3<L>      24 May 2018 06:38  Phos  3.9     05-24  Mg     2.6     05-24          CAPILLARY BLOOD GLUCOSE          RADIOLOGY & ADDITIONAL TESTS:    Imaging Personally Reviewed:  [ ] YES  [ ] NO    Consultant(s) Notes Reviewed:  [ ] YES  [ ] NO
OVERNIGHT EVENTS: None, no complaints.     Present Symptoms:   Dyspnea: None  Nausea/Vomiting: Denies  Anxiety:  Denies  Depressed Mood: Denies  Fatigue: Moderate  Loss of appetite: Moderate  Pain: Denies               Review of Systems: All others negative     MEDICATIONS  (STANDING):  dexamethasone  Injectable 4 milliGRAM(s) IV Push two times a day  enoxaparin Injectable 40 milliGRAM(s) SubCutaneous daily    PHYSICAL EXAM:  Vital Signs Last 24 Hrs  T(C): 36.4 (23 May 2018 14:08), Max: 37.1 (22 May 2018 21:59)  T(F): 97.6 (23 May 2018 14:08), Max: 98.7 (22 May 2018 21:59)  HR: 88 (23 May 2018 14:08) (77 - 94)  BP: 114/52 (23 May 2018 14:08) (107/54 - 133/74)  RR: 16 (23 May 2018 14:08) (16 - 20)  SpO2: 97% (23 May 2018 14:08) (97% - 100%)    General: alert  oriented x _3___ cachexia, verbal, temporal wasting  Karnofsky Performance Score/Palliative Performance Status Version2: 30%    HEENT: normal    Lungs: comfortable  CV: normal    GI: normal    : normal   Musculoskeletal: weakness, ambulatory with assistance  Skin: normal    Neuro: no deficits  Oral intake ability: Minimal  Diet: Pureed    LABS:                          8.7    3.4   )-----------( 84       ( 23 May 2018 06:23 )             28.7     05-23    142  |  108  |  35<H>  ----------------------------<  114<H>  3.9   |  21<L>  |  0.89    Ca    7.9<L>      23 May 2018 06:23    TPro  6.4  /  Alb  2.6<L>  /  TBili  0.8  /  DBili  x   /  AST  80<H>  /  ALT  18  /  AlkPhos  585<H>  05-22    Urinalysis Basic - ( 22 May 2018 11:22 )    Color: Yellow / Appearance: Clear / S.015 / pH: x  Gluc: x / Ketone: Negative  / Bili: Negative / Urobili: Negative   Blood: x / Protein: 15 / Nitrite: Negative   Leuk Esterase: Negative / RBC: 2-5 /HPF / WBC 0-2 /HPF   Sq Epi: x / Non Sq Epi: Occasional /HPF / Bacteria: Trace /HPF    ADVANCE DIRECTIVES: DNR/DNI
Patient agreed to Home Hospice. Arregments in progress.    VS: stable    Lungs: Clear  Cor: NSRS4  Abd: Soft  Neuro- Baseline
Patient is a 89y old  Male who presents with a chief complaint of left sided weakness (22 May 2018 14:55)      INTERVAL HPI/OVERNIGHT EVENTS: No major overnight events. After long discussion with Dr. Bowman and Palliative team, patient decided [facility] hospice    MEDICATIONS  (STANDING):  dexamethasone  Injectable 4 milliGRAM(s) IV Push two times a day  enoxaparin Injectable 40 milliGRAM(s) SubCutaneous daily    MEDICATIONS  (PRN):      Allergies    penicillin (Rash)    Intolerances    Aspir 81 (Other)      REVIEW OF SYSTEMS:  CONSTITUTIONAL: No fever, weight loss, or fatigue  RESPIRATORY: No cough, wheezing, chills or hemoptysis; No shortness of breath  CARDIOVASCULAR: No chest pain, palpitations, dizziness, or leg swelling  GASTROINTESTINAL: No abdominal or epigastric pain. No nausea, vomiting, or hematemesis; No diarrhea or constipation. No melena or hematochezia.  NEUROLOGICAL: No headaches, memory loss, loss of strength, numbness, or tremors  SKIN: No itching, burning, rashes, or lesions     Vital Signs Last 24 Hrs  T(C): 36.4 (23 May 2018 14:08), Max: 37.1 (22 May 2018 21:59)  T(F): 97.6 (23 May 2018 14:08), Max: 98.7 (22 May 2018 21:59)  HR: 88 (23 May 2018 14:08) (77 - 88)  BP: 114/52 (23 May 2018 14:08) (107/54 - 133/74)  BP(mean): --  RR: 16 (23 May 2018 14:08) (16 - 19)  SpO2: 97% (23 May 2018 14:08) (97% - 100%)    PHYSICAL EXAM:  GENERAL: NAD,   HEAD:  Atraumatic, Normocephalic  EYES: EOMI, PERRLA, conjunctiva and sclera clear  NECK: Supple, No JVD, Normal thyroid  CHEST/LUNG: clear to auscultation, Clear to percussion bilaterally; No rales, rhonchi, wheezing, or rubs  HEART: Regular rate and rhythm; No murmurs, rubs, or gallops  ABDOMEN: Soft, Nontender, Nondistended; Bowel sounds present  NERVOUS SYSTEM:  Alert & Oriented X3,   Alert, Strength 5/5 on upper and lower extremity, facial droop improved,   EXTREMITIES:  2+ Peripheral Pulses, No clubbing, cyanosis, or edema  SKIN; warm,dry    LABS:                        8.7    3.4   )-----------( 84       ( 23 May 2018 06:23 )             28.7         142  |  108  |  35<H>  ----------------------------<  114<H>  3.9   |  21<L>  |  0.89    Ca    7.9<L>      23 May 2018 06:23    TPro  6.4  /  Alb  2.6<L>  /  TBili  0.8  /  DBili  x   /  AST  80<H>  /  ALT  18  /  AlkPhos  585<H>      PT/INR - ( 22 May 2018 09:52 )   PT: 16.3 sec;   INR: 1.48 ratio         PTT - ( 22 May 2018 09:52 )  PTT:32.3 sec  Urinalysis Basic - ( 22 May 2018 11:22 )    Color: Yellow / Appearance: Clear / S.015 / pH: x  Gluc: x / Ketone: Negative  / Bili: Negative / Urobili: Negative   Blood: x / Protein: 15 / Nitrite: Negative   Leuk Esterase: Negative / RBC: 2-5 /HPF / WBC 0-2 /HPF   Sq Epi: x / Non Sq Epi: Occasional /HPF / Bacteria: Trace /HPF      CAPILLARY BLOOD GLUCOSE          RADIOLOGY & ADDITIONAL TESTS:    Imaging Personally Reviewed:  [ ] YES  [ ] NO    Consultant(s) Notes Reviewed:  [ ] YES  [ ] NO
Patient is a 89y old  Male who presents with a chief complaint of left sided weakness (22 May 2018 14:55)      INTERVAL HPI/OVERNIGHT EVENTS: No major overnight events. Patient seen at bedside, denies any complains, is on dispo planning to home with home hospice with 24 hour HHA arrangements    MEDICATIONS  (STANDING):  dexamethasone  Injectable 4 milliGRAM(s) IV Push two times a day  enoxaparin Injectable 40 milliGRAM(s) SubCutaneous daily    MEDICATIONS  (PRN):  acetaminophen   Tablet. 650 milliGRAM(s) Oral every 6 hours PRN Mild Pain (1 - 3)      Allergies    penicillin (Rash)    Intolerances    Aspir 81 (Other)      REVIEW OF SYSTEMS:  CONSTITUTIONAL: No fever, weight loss, or fatigue  RESPIRATORY: No cough, wheezing, chills or hemoptysis; No shortness of breath  CARDIOVASCULAR: No chest pain, palpitations, dizziness, or leg swelling  GASTROINTESTINAL: No abdominal or epigastric pain. No nausea, vomiting, or hematemesis; No diarrhea or constipation. No melena or hematochezia.  NEUROLOGICAL: No headaches, memory loss, loss of strength, numbness, or tremors  SKIN: No itching, burning, rashes, or lesions     Vital Signs Last 24 Hrs  T(C): 36.8 (24 May 2018 14:25), Max: 36.8 (24 May 2018 14:25)  T(F): 98.2 (24 May 2018 14:25), Max: 98.2 (24 May 2018 14:25)  HR: 80 (24 May 2018 14:25) (72 - 80)  BP: 154/66 (24 May 2018 14:25) (118/68 - 154/66)  BP(mean): --  RR: 18 (24 May 2018 14:25) (16 - 18)  SpO2: 100% (24 May 2018 14:25) (97% - 100%)      PHYSICAL EXAM:  GENERAL: NAD,   HEAD:  Atraumatic, Normocephalic  EYES: EOMI, PERRLA, conjunctiva and sclera clear  NECK: Supple, No JVD, Normal thyroid  CHEST/LUNG: clear to auscultation, Clear to percussion bilaterally; No rales, rhonchi, wheezing, or rubs  HEART: Regular rate and rhythm; No murmurs, rubs, or gallops  ABDOMEN: Soft, Nontender, Nondistended; Bowel sounds present  NERVOUS SYSTEM:  Alert & Oriented X3,   Alert, Strength 5/5 on upper and lower extremity, facial droop improved,   EXTREMITIES:  2+ Peripheral Pulses, No clubbing, cyanosis, or edema  SKIN; warm,dry    LABS:                        8.9    4.3   )-----------( 112      ( 24 May 2018 06:38 )             29.1     05-24    141  |  107  |  55<H>  ----------------------------<  140<H>  4.0   |  21<L>  |  1.11    Ca    8.3<L>      24 May 2018 06:38  Phos  3.9     05-24  Mg     2.6     05-24          CAPILLARY BLOOD GLUCOSE          RADIOLOGY & ADDITIONAL TESTS:    Imaging Personally Reviewed:  [ ] YES  [ ] NO    Consultant(s) Notes Reviewed:  [ ] YES  [ ] NO
Patient is a 89y old  Male who presents with a chief complaint of left sided weakness (25 May 2018 15:19)      INTERVAL HPI/OVERNIGHT EVENTS: No major overnight events. Patient seen at bedside, denies any complains      MEDICATIONS  (STANDING):  dexamethasone  Injectable 4 milliGRAM(s) IV Push two times a day  enoxaparin Injectable 40 milliGRAM(s) SubCutaneous daily    MEDICATIONS  (PRN):  acetaminophen   Tablet. 650 milliGRAM(s) Oral every 6 hours PRN Mild Pain (1 - 3)      Allergies    penicillin (Rash)    Intolerances    Aspir 81 (Other)      REVIEW OF SYSTEMS:  CONSTITUTIONAL: No fever, weight loss, or fatigue  RESPIRATORY: No cough, wheezing, chills or hemoptysis; No shortness of breath  CARDIOVASCULAR: No chest pain, palpitations, dizziness, or leg swelling  GASTROINTESTINAL: No abdominal or epigastric pain. No nausea, vomiting, or hematemesis; No diarrhea or constipation. No melena or hematochezia.  NEUROLOGICAL: No headaches, memory loss, loss of strength, numbness, or tremors  SKIN: No itching, burning, rashes, or lesions     Vital Signs Last 24 Hrs  T(C): 36.5 (26 May 2018 05:58), Max: 36.8 (25 May 2018 12:57)  T(F): 97.7 (26 May 2018 05:58), Max: 98.3 (25 May 2018 21:43)  HR: 73 (26 May 2018 05:58) (59 - 73)  BP: 144/71 (26 May 2018 05:58) (132/56 - 144/71)  BP(mean): --  RR: 16 (26 May 2018 05:58) (16 - 18)  SpO2: 98% (26 May 2018 05:58) (98% - 100%)    GENERAL: NAD,   HEAD:  Atraumatic, Normocephalic  EYES: EOMI, PERRLA, conjunctiva and sclera clear  NECK: Supple, No JVD, Normal thyroid  CHEST/LUNG: clear to auscultation, Clear to percussion bilaterally; No rales, rhonchi, wheezing, or rubs  HEART: Regular rate and rhythm; No murmurs, rubs, or gallops  ABDOMEN: Soft, Nontender, Nondistended; Bowel sounds present  NERVOUS SYSTEM:  Alert & Oriented X3,   Alert, Strength 5/5 on upper and lower extremity, facial droop improved,   EXTREMITIES:  2+ Peripheral Pulses, No clubbing, cyanosis, or edema  SKIN; warm,    LABS:              CAPILLARY BLOOD GLUCOSE          RADIOLOGY & ADDITIONAL TESTS:    Imaging Personally Reviewed:  [ ] YES  [ ] NO    Consultant(s) Notes Reviewed:  [ ] YES  [ ] NO
Patient is a 89y old  Male who presents with a chief complaint of left sided weakness (25 May 2018 15:19)      INTERVAL HPI/OVERNIGHT EVENTS: No major overnight events. Patient seen at bedside, denies any complains    MEDICATIONS  (STANDING):  dexamethasone  Injectable 4 milliGRAM(s) IV Push two times a day  enoxaparin Injectable 40 milliGRAM(s) SubCutaneous daily    MEDICATIONS  (PRN):  acetaminophen   Tablet. 650 milliGRAM(s) Oral every 6 hours PRN Mild Pain (1 - 3)      Allergies    penicillin (Rash)    Intolerances    Aspir 81 (Other)      REVIEW OF SYSTEMS:  CONSTITUTIONAL: No fever, weight loss, or fatigue  RESPIRATORY: No cough, wheezing, chills or hemoptysis; No shortness of breath  CARDIOVASCULAR: No chest pain, palpitations, dizziness, or leg swelling  GASTROINTESTINAL: No abdominal or epigastric pain. No nausea, vomiting, or hematemesis; No diarrhea or constipation. No melena or hematochezia.  NEUROLOGICAL: No headaches, memory loss, loss of strength, numbness, or tremors  SKIN: No itching, burning, rashes, or lesions     Vital Signs Last 24 Hrs  T(C): 36.7 (28 May 2018 05:08), Max: 36.7 (28 May 2018 05:08)  T(F): 98 (28 May 2018 05:08), Max: 98 (28 May 2018 05:08)  HR: 71 (28 May 2018 05:08) (68 - 75)  BP: 131/70 (28 May 2018 05:08) (120/63 - 131/70)  BP(mean): --  RR: 17 (28 May 2018 05:08) (16 - 17)  SpO2: 99% (28 May 2018 05:08) (97% - 99%)    PHYSICAL EXAM:  GENERAL: NAD,   HEAD:  Atraumatic, Normocephalic  EYES: EOMI, PERRLA, conjunctiva and sclera clear  NECK: Supple, No JVD, Normal thyroid  CHEST/LUNG: clear to auscultation, Clear to percussion bilaterally; No rales, rhonchi, wheezing, or rubs  HEART: Regular rate and rhythm; No murmurs, rubs, or gallops  ABDOMEN: Soft, Nontender, Nondistended; Bowel sounds present  NERVOUS SYSTEM:  Alert & Oriented X3,   Alert, Strength 5/5 on upper and lower extremity, facial droop improved,   EXTREMITIES:  2+ Peripheral Pulses, No clubbing, cyanosis, or edema  SKIN; warm,    LABS:              CAPILLARY BLOOD GLUCOSE          RADIOLOGY & ADDITIONAL TESTS:    Imaging Personally Reviewed:  [ ] YES  [ ] NO    Consultant(s) Notes Reviewed:  [ ] YES  [ ] NO
Patient more alert. Brought to ER for Mental Status changes with inability to talk, very weak. ER found him to have Metastatic brain Cancer.    PMH: Metastastic Prostate cancer, now with Brain involvement.    PE: VS: Stable.    Lungs: Clear.  Cor: NSRS4  Abdomen: soft.  ext: c,c,e.    Neuro: Alert, recognize me by name.    Labs: H/H stable.
condition stable  no pain or fever or sob  able to eat  pt agreed not to have pill  and for home hospice.
more awake  no fever or chills,  move all extremities  speech better with steroid  he wants to continue treatment and his niece agreed  contacted [facility] RT await answer  also discussed with his niece that it is not safe for him to go home by himself  he also needs zytiga  he is willing to pay the copay now, but his niece has to make arrangement for deductable for EPIC
pt seen in am  able to get upon steroid  he requests to have pill for prostate ca but no RT to brainI explained to him thatpill might not work for brain mets.  taking pill can be futile.  If he insists to have pill, then he can not be in hospice. discussed with caregiver.

## 2018-05-28 NOTE — PROGRESS NOTE ADULT - PROBLEM SELECTOR PROBLEM 3
Debility
Need for prophylactic measure

## 2018-05-28 NOTE — PROGRESS NOTE ADULT - PROBLEM SELECTOR PLAN 3
[] Previous VTE                                                3  [] Thrombophilia                                             2  [] Lower limb paralysis                                   2    [X] Current Cancer                                             2   [X] Immobilization > 24 hrs                              1  [] ICU/CCU stay > 24 hours                             1  [X] Age > 60                                                         1    IMPROVE VTE Score: 4  Lovenox

## 2018-05-28 NOTE — PROGRESS NOTE ADULT - PROBLEM SELECTOR PROBLEM 2
Pain
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

## 2018-06-09 NOTE — ED ADULT TRIAGE NOTE - CHIEF COMPLAINT QUOTE
Sudden onset sob while blood being [place] [facility] [place]

## 2018-06-09 NOTE — ED ADULT NURSE NOTE - CHIEF COMPLAINT QUOTE
Sudden onset sob while blood being [place] [facility] [place]

## 2018-06-10 NOTE — PATIENT PROFILE ADULT. - FALL HARM RISK
bones(Osteoporosis,prev fx,steroid use,metastatic bone ca/other/age(85 years old or older)/fatigue. anemia, weakness

## 2018-06-10 NOTE — H&P ADULT - PROBLEM SELECTOR PLAN 3
Stable   No more treatment Stable   No more treatment as per last admission  Palliative will be reconsulted

## 2018-06-10 NOTE — H&P ADULT - NSHPPHYSICALEXAM_GEN_ALL_CORE
General - NAD, sitting up in bed, Pale  Eyes - PERRLA, EOM intact  ENT - Nonicteric sclerae, PERRLA, EOMI. Oropharynx clear. Moist mucous membranes. Conjunctivae appear well perfused.   Neck - No noticeable or palpable swelling, redness or rash around throat or on face  Lymph Nodes - No lymphadenopathy  Cardiovascular - RRR no m/r/g, no JVD, no carotid bruits  Lungs - Clear to ascultation, no use of accessory muscles, no crackles or wheezes.  Skin - No rashes, skin warm and dry, no erythematous areas  Abdomen - Normal bowel sounds, abdomen soft and nontender  Extremities - No edema, cyanosis or clubbing  Musculo Skeletal - Normal range of motion, no swollen or erythematous  joints.  Neurological – Alert and oriented x 3, CN 2-12 grossly intact.

## 2018-06-10 NOTE — ED ADULT NURSE REASSESSMENT NOTE - NS ED NURSE REASSESS COMMENT FT1
Blood Transfusion initiated after conformation with MD Adkins, no signs of allergic reaction. Patient repositioned in bed for comfort. A second peripheral IV line inserted to right lower forearm.

## 2018-06-10 NOTE — H&P ADULT - PROBLEM SELECTOR PLAN 1
Was found to have an Hemoglobin of 8.5 down from 8.9 from last admission  on May.  SOB likely secondary to Chronic anemia  ED physician ordered 1 unit of PRBC  Follow up CBC Was found to have an Hemoglobin of 8.5 down from 8.9 from last admission  on May.  SOB likely secondary to Chronic anemia  ED physician ordered 1 unit of PRBC  Follow up CBC after transfusion

## 2018-06-10 NOTE — H&P ADULT - HISTORY OF PRESENT ILLNESS
89 M with hx of anemia, metastatic prostate cancer sent to hospital for episode of shortness of breath.  Patient states that he feels short of breath and thinks he needs a transfusion.  No cough, no fever, no chest pain.  Patient has MOLST form stating DNR/DNI. 89 M with PMHx of anemia, metastatic prostate cancer to brain sent to hospital for episode of shortness of breath in nursing home. Was found to have an Hemoglobin of 8.5 down from 8.9 from last admission  on May. Patient was discharged to [facility] after refusing Hospice care. Patient refers to feel SOB and thinks he needs a transfusion.  No cough, no fever, no chest pain.        Patient was DNR/DNI in last admission.

## 2018-06-10 NOTE — H&P ADULT - ASSESSMENT
89 M with PMHx of anemia, metastatic prostate cancer to brain sent to hospital for episode of shortness of breath in nursing home. Was found to have an Hemoglobin of 8.5 down from 8.9 from last admission  on May. Patient was discharged to [facility] after refusing Hospice care. Patient refers to feel SOB and thinks he needs a transfusion.  No cough, no fever, no chest pain.

## 2018-06-10 NOTE — PATIENT PROFILE ADULT. - LANGUAGE ASSISTANCE NEEDED
No-Patient/Caregiver offered and refused free interpretation services./also speaks English. Speech is slurred

## 2018-06-10 NOTE — ED PROVIDER NOTE - OBJECTIVE STATEMENT
89 M with hx of anemia, metastatic prostate cancer sent to hospital for episode of shortness of breath.  Patient states that he feels short of breath and thinks he needs a transfusion.  No cough, no fever, no chest pain.  Patient has MOLST form stating DNR/DNI.

## 2018-06-10 NOTE — H&P ADULT - PROBLEM SELECTOR PLAN 5
IMPROVE VTE score: 4  Will manage with: Lovenox S/C     [ ] Previous VTE                                    3  [ ] Thrombophilia                                  2  [] Lower limb paralysis                        2  (unable to hold up >15 seconds)    [x ] Current Cancer (within 6 months)        2   [x] Immobilization > 24 hrs                    1  [ ] ICU/CCU stay > 24 hrs                      1  [x] Age > 60                                         1

## 2018-06-11 NOTE — PROGRESS NOTE ADULT - PROBLEM SELECTOR PLAN 1
Patient sent in from [facility] with symptomatic anemia (dyspnea)   Hemoglobin decreased to 8.5g/dL in ED  S/p 1U PRBC  Continue with IV venofer x 2 more days  Continue to monitor CBC

## 2018-06-11 NOTE — CONSULT NOTE ADULT - SUBJECTIVE AND OBJECTIVE BOX
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 Was found to have an Hemoglobin of 8.5 down from 8.9 from last admission on May. Patient was discharged to [facility] after refusing Hospice care. Patient refers to feel SOB and thinks he needs a transfusion.  No cough, no fever, no chest pain.      Patient was DNR/DNI in last admission, documented in MOLST form in the chart. (10 Norm 2018 00:47)    PAST MEDICAL & SURGICAL HISTORY:    ·	Anemia  ·	Prostate cancer metastatic to multiple sites  ·	Cataract of left eye  ·	GERD (gastroesophageal reflux disease)  ·	History of bilateral cataract extraction    FAMILY HISTORY:    ·	No pertinent family history in first degree relatives    Allergies    ·	penicillin (Rash)    Intolerances    ·	Aspir 81 (Other)    Present Symptoms:     ·	Generalized fatigue     MEDICATIONS  (STANDING):    ·	cyanocobalamin Injectable 1000 MICROGram(s) IntraMuscular once  ·	enoxaparin Injectable 40 milliGRAM(s) SubCutaneous daily  ·	iron sucrose IVPB 200 milliGRAM(s) IV Intermittent every 24 hours  ·	melatonin 3 milliGRAM(s) Oral at bedtime  ·	pantoprazole    Tablet 40 milliGRAM(s) Oral before breakfast    MEDICATIONS  (PRN):    ·	acetaminophen   Tablet. 650 milliGRAM(s) Oral every 6 hours PRN Mild Pain (1 - 3)  ·	guaiFENesin   Syrup  (Sugar-Free) 100 milliGRAM(s) Oral every 6 hours PRN Cough    Vital Signs Last 24 Hrs  ·	T(C): 36.6 (2018 07:44), Max: 36.7 (10 Norm 2018 15:52)  ·	T(F): 97.8 (2018 07:44), Max: 98 (10 Norm 2018 15:52)  ·	HR: 85 (2018 10:00) (56 - 85)  ·	BP: 128/74 (2018 10:00) (104/56 - 128/74)  ·	RR: 17 (2018 07:44) (17 - 20)  ·	SpO2: 97% (2018 07:44) (97% - 99%)    PHYSICAL EXAM:    General: alert  oriented x 3, cachectic  Karnofsky Performance Score/Palliative Performance Status Version2: 40 %    ·	GENERAL: cachectic, lying in bed  ·	CHEST/LUNG: Clear to auscultation bilaterally with good air entry   ·	HEART: S1 S2,  regular rate and rhythm,; no murmurs  ·	ABDOMEN: Soft, Nontender, Nondistended; Bowel sounds decreased  ·	EXTREMITIES: no cyanosis; no edema; no calf tenderness  ·	SKIN: pallor   ·	NERVOUS SYSTEM:  AAOx3; no new focal deficits    LABS:                        9.2    3.7   )-----------( 54       ( 2018 07:22 )             29.7     06-    140  |  109<H>  |  28<H>  ----------------------------<  72  3.4<L>   |  23  |  0.60    Ca    7.6<L>      2018 07:22    TPro  5.4<L>  /  Alb  2.5<L>  /  TBili  0.4  /  DBili  x   /  AST  32  /  ALT  39  /  AlkPhos  400<H>  -09    Urinalysis Basic - ( 2018 22:05 )    Color: Yellow / Appearance: Clear / S.015 / pH: x  Gluc: x / Ketone: Negative  / Bili: Negative / Urobili: Negative   Blood: x / Protein: 15 / Nitrite: Negative   Leuk Esterase: Negative / RBC: 2-5 /HPF / WBC 3-5 /HPF   Sq Epi: x / Non Sq Epi: Few /HPF / Bacteria: Few /HPF

## 2018-06-11 NOTE — PROGRESS NOTE ADULT - SUBJECTIVE AND OBJECTIVE BOX
PGY 1 Note discussed with supervising resident and primary attending    Patient is a 89y old  Male who presents with a chief complaint of Symptomatic anemia (10 Norm 2018 00:47)      INTERVAL HPI/OVERNIGHT EVENTS: patient examined at bedside. He/She has no complaints and current symptoms are resolving    Overnight events on telemetry monitoring []    MEDICATIONS  (STANDING):  enoxaparin Injectable 40 milliGRAM(s) SubCutaneous daily  iron sucrose IVPB 200 milliGRAM(s) IV Intermittent every 24 hours  melatonin 3 milliGRAM(s) Oral at bedtime  pantoprazole    Tablet 40 milliGRAM(s) Oral before breakfast    MEDICATIONS  (PRN):  acetaminophen   Tablet. 650 milliGRAM(s) Oral every 6 hours PRN Mild Pain (1 - 3)  guaiFENesin   Syrup  (Sugar-Free) 100 milliGRAM(s) Oral every 6 hours PRN Cough    _______________________________________________  REVIEW OF SYSTEMS:  CONSTITUTIONAL: No fever  NECK: No pain or stiffness  RESPIRATORY: No cough; No shortness of breath  CARDIOVASCULAR: No chest pain, no palpitations  GASTROINTESTINAL: No pain. No constipation, nausea or vomiting; No diarrhea   NEUROLOGICAL: No headache or numbness, no tremors  MUSCULOSKELETAL: No joint pain, no muscle pain  GENITOURINARY: no dysuria, no frequency, no hesitancy  PSYCHIATRY: no depression , no anxiety    Vital Signs Last 24 Hrs  T(C): 36.3 (10 Norm 2018 23:52), Max: 36.7 (10 Norm 2018 15:52)  T(F): 97.4 (10 Norm 2018 23:52), Max: 98 (10 Norm 2018 15:52)  HR: 65 (10 Norm 2018 23:52) (55 - 79)  BP: 108/56 (10 Norm 2018 23:52) (104/56 - 119/81)  BP(mean): --  RR: 18 (10 Norm 2018 23:52) (18 - 20)  SpO2: 97% (10 Norm 2018 23:52) (97% - 100%)  ________________________________________________  PHYSICAL EXAM:  GENERAL: NAD, lying in bed  HEENT: Normocephalic;  conjunctivae and sclerae clear; moist mucous membranes  NECK : supple, trachea midline, no JVD  CHEST/LUNG: Clear to auscultation bilaterally with good air entry   HEART: S1 S2,  regular rate and rhythm,; no murmurs  ABDOMEN: Soft, Nontender, Nondistended; Bowel sounds present  EXTREMITIES: no cyanosis; no edema; no calf tenderness  SKIN: no rash  NERVOUS SYSTEM:  AAOx3; no new focal deficits  _________________________________________________  LABS:                        10.1   4.5   )-----------( 67       ( 10 Norm 2018 11:01 )             32.4         142  |  111<H>  |  36<H>  ----------------------------<  119<H>  4.4   |  23  |  0.76    Ca    7.8<L>      2018 20:15    TPro  5.4<L>  /  Alb  2.5<L>  /  TBili  0.4  /  DBili  x   /  AST  32  /  ALT  39  /  AlkPhos  400<H>        Urinalysis Basic - ( 2018 22:05 )    Color: Yellow / Appearance: Clear / S.015 / pH: x  Gluc: x / Ketone: Negative  / Bili: Negative / Urobili: Negative   Blood: x / Protein: 15 / Nitrite: Negative   Leuk Esterase: Negative / RBC: 2-5 /HPF / WBC 3-5 /HPF   Sq Epi: x / Non Sq Epi: Few /HPF / Bacteria: Few /HPF      CAPILLARY BLOOD GLUCOSE        RADIOLOGY & ADDITIONAL TESTS:    Consultant(s) Notes Reviewed:   YES    Care Discussed with Consultants:   Infectious Disease [] Endocrinology [] Neurology [] ENT [] Cardiology [] Electrophysiology [] Pulmonology [] Gastroenterology [] Nephrology [] Urology [] Orthopaedics [] Vascular Surgery [] Thoracic Surgery [] Plastic Surgery [] General Surgery [] Podiatry [] Psychiatry [] Hematology/Oncology [] Pain [] Palliative Care []    Plan of care was discussed with patient and/or primary care giver; all questions and concerns were addressed and care was aligned with patient's wishes. PGY 1 Note discussed with supervising resident and primary attending    Patient is a 89y old  Male who presents with a chief complaint of Symptomatic anemia (10 Norm 2018 00:47)      INTERVAL HPI/OVERNIGHT EVENTS: patient examined at bedside. Patient reports that his symptoms of dyspnea have improved, but he still complains of feeling fatigued and weak. He claims that he needs another blood transfusion, but denies any episodes of bleeding including epistaxis, melena, hematemesis, or hematuria.     Overnight events on telemetry monitoring []    MEDICATIONS  (STANDING):  enoxaparin Injectable 40 milliGRAM(s) SubCutaneous daily  iron sucrose IVPB 200 milliGRAM(s) IV Intermittent every 24 hours  melatonin 3 milliGRAM(s) Oral at bedtime  pantoprazole    Tablet 40 milliGRAM(s) Oral before breakfast    MEDICATIONS  (PRN):  acetaminophen   Tablet. 650 milliGRAM(s) Oral every 6 hours PRN Mild Pain (1 - 3)  guaiFENesin   Syrup  (Sugar-Free) 100 milliGRAM(s) Oral every 6 hours PRN Cough    _______________________________________________  REVIEW OF SYSTEMS:  CONSTITUTIONAL: No fever, but reports fatigue and weakness   RESPIRATORY: No cough; No shortness of breath  CARDIOVASCULAR: No chest pain, no palpitations  GASTROINTESTINAL: No pain. No constipation, nausea or vomiting; No diarrhea   NEUROLOGICAL: No headache  MUSCULOSKELETAL: No joint pain, no muscle pain  GENITOURINARY: no dysuria, no hematuria     Vital Signs Last 24 Hrs  T(C): 36.3 (10 Norm 2018 23:52), Max: 36.7 (10 Norm 2018 15:52)  T(F): 97.4 (10 Norm 2018 23:52), Max: 98 (10 Norm 2018 15:52)  HR: 65 (10 Norm 2018 23:52) (55 - 79)  BP: 108/56 (10 Norm 2018 23:52) (104/56 - 119/81)  BP(mean): --  RR: 18 (10 Norm 2018 23:52) (18 - 20)  SpO2: 97% (10 Norm 2018 23:52) (97% - 100%)  ________________________________________________  PHYSICAL EXAM:  GENERAL: cachcetic, lying in bed  CHEST/LUNG: Clear to auscultation bilaterally with good air entry   HEART: S1 S2,  regular rate and rhythm,; no murmurs  ABDOMEN: Soft, Nontender, Nondistended; Bowel sounds decreased  EXTREMITIES: no cyanosis; no edema; no calf tenderness  SKIN: pallor   NERVOUS SYSTEM:  AAOx3; no new focal deficits  _________________________________________________  LABS:                        10.1   4.5   )-----------( 67       ( 10 Norm 2018 11:01 )             32.4         142  |  111<H>  |  36<H>  ----------------------------<  119<H>  4.4   |  23  |  0.76    Ca    7.8<L>      2018 20:15    TPro  5.4<L>  /  Alb  2.5<L>  /  TBili  0.4  /  DBili  x   /  AST  32  /  ALT  39  /  AlkPhos  400<H>        Urinalysis Basic - ( 2018 22:05 )    Color: Yellow / Appearance: Clear / S.015 / pH: x  Gluc: x / Ketone: Negative  / Bili: Negative / Urobili: Negative   Blood: x / Protein: 15 / Nitrite: Negative   Leuk Esterase: Negative / RBC: 2-5 /HPF / WBC 3-5 /HPF   Sq Epi: x / Non Sq Epi: Few /HPF / Bacteria: Few /HPF      CAPILLARY BLOOD GLUCOSE        RADIOLOGY & ADDITIONAL TESTS:    Consultant(s) Notes Reviewed:   YES    Care Discussed with Consultants:   Infectious Disease [] Endocrinology [] Neurology [] ENT [] Cardiology [] Electrophysiology [] Pulmonology [] Gastroenterology [] Nephrology [] Urology [] Orthopaedics [] Vascular Surgery [] Thoracic Surgery [] Plastic Surgery [] General Surgery [] Podiatry [] Psychiatry [] Hematology/Oncology [] Pain [] Palliative Care [x]    Plan of care was discussed with patient and/or primary care giver; all questions and concerns were addressed and care was aligned with patient's wishes.

## 2018-06-11 NOTE — DISCHARGE NOTE ADULT - PLAN OF CARE
Continue with your iron supplementation and repeat your complete blood count in 1 week You were admitted for symptomatic anemia and received 1U packed linda blood cell transfusion. We also gave you a several day course of intravenous iron - both of which led to improvement in your red blood cell count.  Please continue with your iron supplements as prescribed and repeat your complete blood cell count within 1 week of discharge. Continue with your proton pump inhibitor as prescribed. You were admitted for symptomatic anemia and received 1U packed linda blood cell transfusion. We also gave you a several day course of intravenous iron - both of which led to improvement in your red blood cell count.  Please continue with your iron and b12 supplements as prescribed and repeat your complete blood cell count within 1 week of discharge. Continue with your decadron as prescribed.

## 2018-06-11 NOTE — CONSULT NOTE ADULT - PROBLEM SELECTOR RECOMMENDATION 9
stage 4 prostate cancer, patient is not receiving any additional treatment as per records from prior admission  currently on Dexamethasone 4mg IV 2 times a day   c/w supportive care

## 2018-06-11 NOTE — ADVANCED PRACTICE NURSE CONSULT - RECOMMEDATIONS
-Clean the Coccyx wound with normal saline and apply skin prep to the surrounding skin  -Apply a Foam dressing Q 72hrs PRN  -Encourage the patient to reposition Q 2hrs using wedges or pillows

## 2018-06-11 NOTE — CONSULT NOTE ADULT - PROBLEM SELECTOR RECOMMENDATION 4
Pt had filled up MOLST form on prior admission  stating DNR / DNI and comfort care   pt will go back to [facility]

## 2018-06-11 NOTE — CONSULT NOTE ADULT - ASSESSMENT
89y/o from [facility]. PMH of anemia, metastatic prostate CA (brain) admitted for symptomatic anemia

## 2018-06-11 NOTE — DISCHARGE NOTE ADULT - PATIENT PORTAL LINK FT
You can access the [facility], offered by [facility], by registering with the following website: [email or web address]

## 2018-06-11 NOTE — DISCHARGE NOTE ADULT - MEDICATION SUMMARY - MEDICATIONS TO TAKE
I will START or STAY ON the medications listed below when I get home from the hospital:    dexamethasone  -- 4 milligram(s) injectable 2 times a day for brain metastasis  -- Indication: For Prostate cancer metastatic to multiple sites    Slow Fe (as elemental iron) 45 mg oral tablet, extended release  -- 1 tab(s) by mouth once a day   -- Check with your doctor before becoming pregnant.  May discolor urine or feces.  Some non-prescription drugs may aggravate your condition.  Read all labels carefully.  If a warning appears, check with your doctor before taking.  Swallow whole.  Do not crush.    -- Indication: For Anemia    Protonix 40 mg oral delayed release tablet  -- 1 tab(s) by mouth once a day for Ulcer prophylaxis as patient is on steroid  -- Indication: For GERD (gastroesophageal reflux disease)    cyanocobalamin 1000 mcg oral tablet  -- 1 tab(s) by mouth once a day   -- Indication: For Anemia    Acerola 500 mg oral tablet, chewable  -- 1 tab(s) chewed once a day   -- Chew tablets before swallowing    -- Indication: For Nutrition

## 2018-06-11 NOTE — DISCHARGE NOTE ADULT - CARE PLAN
Principal Discharge DX:	Anemia  Goal:	Continue with your iron supplementation and repeat your complete blood count in 1 week  Assessment and plan of treatment:	You were admitted for symptomatic anemia and received 1U packed linda blood cell transfusion. We also gave you a several day course of intravenous iron - both of which led to improvement in your red blood cell count.  Please continue with your iron supplements as prescribed and repeat your complete blood cell count within 1 week of discharge.  Secondary Diagnosis:	GERD (gastroesophageal reflux disease)  Assessment and plan of treatment:	Continue with your proton pump inhibitor as prescribed.  Secondary Diagnosis:	Prostate cancer metastatic to multiple sites Principal Discharge DX:	Anemia  Goal:	Continue with your iron supplementation and repeat your complete blood count in 1 week  Assessment and plan of treatment:	You were admitted for symptomatic anemia and received 1U packed linda blood cell transfusion. We also gave you a several day course of intravenous iron - both of which led to improvement in your red blood cell count.  Please continue with your iron and b12 supplements as prescribed and repeat your complete blood cell count within 1 week of discharge.  Secondary Diagnosis:	GERD (gastroesophageal reflux disease)  Assessment and plan of treatment:	Continue with your proton pump inhibitor as prescribed.  Secondary Diagnosis:	Prostate cancer metastatic to multiple sites Principal Discharge DX:	Anemia  Goal:	Continue with your iron supplementation and repeat your complete blood count in 1 week  Assessment and plan of treatment:	You were admitted for symptomatic anemia and received 1U packed linda blood cell transfusion. We also gave you a several day course of intravenous iron - both of which led to improvement in your red blood cell count.  Please continue with your iron and b12 supplements as prescribed and repeat your complete blood cell count within 1 week of discharge.  Secondary Diagnosis:	GERD (gastroesophageal reflux disease)  Assessment and plan of treatment:	Continue with your proton pump inhibitor as prescribed.  Secondary Diagnosis:	Prostate cancer metastatic to multiple sites  Assessment and plan of treatment:	Continue with your decadron as prescribed.

## 2018-06-11 NOTE — PROGRESS NOTE ADULT - PROBLEM SELECTOR PLAN 2
Patient is not receiving any additional treatment as per records from prior admission  Palliative care following Patient is not receiving any additional treatment as per records from prior admission  Palliative care consulted

## 2018-06-11 NOTE — DISCHARGE NOTE ADULT - HOSPITAL COURSE
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 Patient had been discharged to [facility] after refusing hospice care when being discharged from hospital several weeks prior.     ED - vitals stable  Labs significant for Hemoglobin of 8.5g/dL (down from 8.9g/dL)  S/p 1U PRBC transfusion    On the floors, patient was started on intravenous iron supplementation and his red blood cell count improved. He continued to complain of significant fatigue, however.   Palliative care was re-consulted and (PALLIATIvE)    Given patient's improved clinical status and current hemodynamic stability, decision was made to discharge.  Please refer to patient's complete medical chart with documents for a full hospital course, for this is only a brief summary. 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 Patient had been discharged to [facility] after refusing hospice care when being discharged from hospital several weeks prior.     ED - vitals stable  Labs significant for Hemoglobin of 8.5g/dL (down from 8.9g/dL)  S/p 1U PRBC transfusion    On the floors, patient was started on intravenous iron supplementation and his red blood cell count improved. He continued to complain of significant fatigue, however.   He will be discharged with iron and b12 supplements and instructions to repeat CBC testing within 1 week fo discharge.     Given patient's improved clinical status and current hemodynamic stability, decision was made to discharge.  Please refer to patient's complete medical chart with documents for a full hospital course, for this is only a brief summary.

## 2018-06-11 NOTE — PHYSICAL THERAPY INITIAL EVALUATION ADULT - LEVEL OF INDEPENDENCE: STAIR NEGOTIATION, REHAB EVAL
Unable to assess pt on the stairs at this time, pt does not exhibit appropriate pre requisite skills to safely negotiate unlevel surfaces due to poor endurance, decrease musculature and unsteady gait which placed pt significant risks for falls and injury

## 2018-06-11 NOTE — ADVANCED PRACTICE NURSE CONSULT - ASSESSMENT
This is a 89yr old male patient admitted for Shortness of Breath, presenting with a Stage 1 Pressure Injury to Coccyx, as evident by non-blanchable erythema

## 2018-06-12 NOTE — PROGRESS NOTE ADULT - ATTENDING COMMENTS
Plan: Return to [facility] today.
patient was seen and evaluated, agreed above treatment/discharge planning.
patient was seen and evaluated at bed side agreed above treatment/discharge planning

## 2018-06-12 NOTE — PROGRESS NOTE ADULT - SUBJECTIVE AND OBJECTIVE BOX
Patient with Advanced Prostate Cancer, metastatic c to Bone/Brain. Very weak, symptomatic anemia.    PE: VS Stable.  Lungs: Clear  Cor: NSRS4  Neuro A & O x 3    Labs: Hgb > 10 post transfusion.

## 2018-06-12 NOTE — PROGRESS NOTE ADULT - PROBLEM SELECTOR PLAN 1
Patient sent in from [facility] with symptomatic anemia (dyspnea)   Hemoglobin decreased to 8.5g/dL in ED  S/p 1U PRBC  Continue with IV venofer x 2 more days  Continue to monitor CBC Patient sent in from [facility] with symptomatic anemia (dyspnea)   Hemoglobin decreased to 8.5g/dL in ED  S/p 1U PRBC  Continue with IV venofer x 1 more day  Continue to monitor CBC

## 2018-06-12 NOTE — PROGRESS NOTE ADULT - PROBLEM SELECTOR PLAN 2
Patient is not receiving any additional treatment as per records from prior admission  Palliative care following Patient is not receiving any additional treatment as per records from prior admission  Palliative care following; however, patient is deferring for hospice care

## 2018-06-12 NOTE — PROGRESS NOTE ADULT - SUBJECTIVE AND OBJECTIVE BOX
PGY 1 Note discussed with supervising resident and primary attending    Patient is a 89y old  Male who presents with a chief complaint of Symptomatic anemia (11 Jun 2018 08:23)      INTERVAL HPI/OVERNIGHT EVENTS: patient examined at bedside. He/She has no complaints and current symptoms are resolving    Overnight events on telemetry monitoring []    MEDICATIONS  (STANDING):  enoxaparin Injectable 40 milliGRAM(s) SubCutaneous daily  iron sucrose IVPB 200 milliGRAM(s) IV Intermittent every 24 hours  pantoprazole    Tablet 40 milliGRAM(s) Oral before breakfast    MEDICATIONS  (PRN):  acetaminophen   Tablet. 650 milliGRAM(s) Oral every 6 hours PRN Mild Pain (1 - 3)  guaiFENesin   Syrup  (Sugar-Free) 100 milliGRAM(s) Oral every 6 hours PRN Cough    _______________________________________________  REVIEW OF SYSTEMS:  CONSTITUTIONAL: No fever  NECK: No pain or stiffness  RESPIRATORY: No cough; No shortness of breath  CARDIOVASCULAR: No chest pain, no palpitations  GASTROINTESTINAL: No pain. No constipation, nausea or vomiting; No diarrhea   NEUROLOGICAL: No headache or numbness, no tremors  MUSCULOSKELETAL: No joint pain, no muscle pain  GENITOURINARY: no dysuria, no frequency, no hesitancy  PSYCHIATRY: no depression , no anxiety    Vital Signs Last 24 Hrs  T(C): 36.6 (11 Jun 2018 23:52), Max: 36.6 (11 Jun 2018 07:44)  T(F): 97.8 (11 Jun 2018 23:52), Max: 97.8 (11 Jun 2018 07:44)  HR: 71 (11 Jun 2018 23:52) (56 - 85)  BP: 146/73 (11 Jun 2018 23:52) (124/56 - 146/73)  BP(mean): --  RR: 18 (11 Jun 2018 23:52) (17 - 18)  SpO2: 96% (11 Jun 2018 23:52) (96% - 97%)  ________________________________________________  PHYSICAL EXAM:  GENERAL: NAD, lying in bed  HEENT: Normocephalic;  conjunctivae and sclerae clear; moist mucous membranes  NECK : supple, trachea midline, no JVD  CHEST/LUNG: Clear to auscultation bilaterally with good air entry   HEART: S1 S2,  regular rate and rhythm,; no murmurs  ABDOMEN: Soft, Nontender, Nondistended; Bowel sounds present  EXTREMITIES: no cyanosis; no edema; no calf tenderness  SKIN: no rash  NERVOUS SYSTEM:  AAOx3; no new focal deficits  _________________________________________________  LABS:                        9.2    3.7   )-----------( 54       ( 11 Jun 2018 07:22 )             29.7     06-11    140  |  109<H>  |  28<H>  ----------------------------<  72  3.4<L>   |  23  |  0.60    Ca    7.6<L>      11 Jun 2018 07:22          CAPILLARY BLOOD GLUCOSE        RADIOLOGY & ADDITIONAL TESTS:    Consultant(s) Notes Reviewed:   YES    Care Discussed with Consultants:   Infectious Disease [] Endocrinology [] Neurology [] ENT [] Cardiology [] Electrophysiology [] Pulmonology [] Gastroenterology [] Nephrology [] Urology [] Orthopaedics [] Vascular Surgery [] Thoracic Surgery [] Plastic Surgery [] General Surgery [] Podiatry [] Psychiatry [] Hematology/Oncology [] Pain [] Palliative Care [x]    Plan of care was discussed with patient and/or primary care giver; all questions and concerns were addressed and care was aligned with patient's wishes. PGY 1 Note discussed with supervising resident and primary attending    Patient is a 89y old  Male who presents with a chief complaint of Symptomatic anemia (11 Jun 2018 08:23)      INTERVAL HPI/OVERNIGHT EVENTS: patient examined at bedside. He still complains of feeling tired, and also claims to be feeling cold. He has no other complaints, however, and anticipates returning to [facility] today.    Overnight events on telemetry monitoring []    MEDICATIONS  (STANDING):  enoxaparin Injectable 40 milliGRAM(s) SubCutaneous daily  iron sucrose IVPB 200 milliGRAM(s) IV Intermittent every 24 hours  pantoprazole    Tablet 40 milliGRAM(s) Oral before breakfast    MEDICATIONS  (PRN):  acetaminophen   Tablet. 650 milliGRAM(s) Oral every 6 hours PRN Mild Pain (1 - 3)  guaiFENesin   Syrup  (Sugar-Free) 100 milliGRAM(s) Oral every 6 hours PRN Cough    _______________________________________________  REVIEW OF SYSTEMS:  CONSTITUTIONAL: No fever, but reports feeling cold and tired   RESPIRATORY: No cough; No shortness of breath  CARDIOVASCULAR: No chest pain, no palpitations  GASTROINTESTINAL: No pain. No constipation, nausea or vomiting; No diarrhea   NEUROLOGICAL: No headache   GENITOURINARY: no dysuria    Vital Signs Last 24 Hrs  T(C): 36.6 (11 Jun 2018 23:52), Max: 36.6 (11 Jun 2018 07:44)  T(F): 97.8 (11 Jun 2018 23:52), Max: 97.8 (11 Jun 2018 07:44)  HR: 71 (11 Jun 2018 23:52) (56 - 85)  BP: 146/73 (11 Jun 2018 23:52) (124/56 - 146/73)  BP(mean): --  RR: 18 (11 Jun 2018 23:52) (17 - 18)  SpO2: 96% (11 Jun 2018 23:52) (96% - 97%)  ________________________________________________  PHYSICAL EXAM:  GENERAL: NAD, lying in bed. Cachectic   CHEST/LUNG: Clear to auscultation bilaterally with good air entry   HEART: S1 S2,  regular rate and rhythm,; no murmurs  ABDOMEN: Soft, Nontender, Nondistended; Bowel sounds present  EXTREMITIES: no cyanosis; no edema; no calf tenderness  NERVOUS SYSTEM:  AAOx3; no new focal deficits  _________________________________________________  LABS:                        9.2    3.7   )-----------( 54       ( 11 Jun 2018 07:22 )             29.7     06-11    140  |  109<H>  |  28<H>  ----------------------------<  72  3.4<L>   |  23  |  0.60    Ca    7.6<L>      11 Jun 2018 07:22          CAPILLARY BLOOD GLUCOSE        RADIOLOGY & ADDITIONAL TESTS:    Consultant(s) Notes Reviewed:   YES    Care Discussed with Consultants:   Infectious Disease [] Endocrinology [] Neurology [] ENT [] Cardiology [] Electrophysiology [] Pulmonology [] Gastroenterology [] Nephrology [] Urology [] Orthopaedics [] Vascular Surgery [] Thoracic Surgery [] Plastic Surgery [] General Surgery [] Podiatry [] Psychiatry [] Hematology/Oncology [] Pain [] Palliative Care [x]    Plan of care was discussed with patient and/or primary care giver; all questions and concerns were addressed and care was aligned with patient's wishes.

## 2018-06-12 NOTE — PROGRESS NOTE ADULT - ASSESSMENT
89M PMHx anemia, metastatic prostate CA (brain) admitted for symptomatic anemia
89M PMHx anemia, metastatic prostate CA (brain) admitted for symptomatic anemia
Imp: Metastatic Prostate Cancer.  Symptomatic Anemia.

## 2018-06-12 NOTE — PROGRESS NOTE ADULT - PROBLEM SELECTOR PLAN 4
IMPROVE VTE Individual Risk Assessment  RISK                                                          Points  [] Previous VTE                                           3  [] Thrombophilia                                        2  [] Lower limb paralysis                              2   [x] Current Cancer                                       2   [x] Immobilization > 24 hrs                        1  [] ICU/CCU stay > 24 hours                       1  [x] Age > 60                                                   1  IMPROVE VTE Score = 4   Lovenox for DVT chemoprophylaxis  Protonix for GERD
IMPROVE VTE Individual Risk Assessment  RISK                                                          Points  [] Previous VTE                                           3  [] Thrombophilia                                        2  [] Lower limb paralysis                              2   [x] Current Cancer                                       2   [x] Immobilization > 24 hrs                        1  [] ICU/CCU stay > 24 hours                       1  [x] Age > 60                                                   1  IMPROVE VTE Score = 4   Lovenox for DVT chemoprophylaxis  Protonix for GERD

## 2018-10-09 NOTE — H&P ADULT - PROBLEM SELECTOR PLAN 9
[] Previous VTE                                                3  [] Thrombophilia                                             2  [] Lower limb paralysis                                   2    [] Current Cancer                                             2   [] Immobilization > 24 hrs                              1  [] ICU/CCU stay > 24 hours                             1  [] Age > 60                                                         1    IMPROVE VTE Score: Hold AC due to risk of bleed

## 2018-10-09 NOTE — H&P ADULT - PROBLEM SELECTOR PLAN 10
-Had lengthy discussion with family.  They would like to pursue comfort measures at this time, as this was the patients wished when he stopped chemo 4 months prior.  They would like pain control.  They would also like to re-discuss hospice care as they seem interested in residential hospice.

## 2018-10-09 NOTE — H&P ADULT - NSHPPHYSICALEXAM_GEN_ALL_CORE
Vital Signs Last 24 Hrs  T(C): 34.8 (09 Oct 2018 16:19), Max: 34.8 (09 Oct 2018 13:59)  T(F): 94.6 (09 Oct 2018 16:19), Max: 94.6 (09 Oct 2018 13:59)  HR: 130 (09 Oct 2018 16:19) (92 - 130)  BP: 110/67 (09 Oct 2018 16:19) (61/32 - 110/67)  RR: 18 (09 Oct 2018 16:19) (18 - 20)  SpO2: 97% (09 Oct 2018 16:19) (97% - 100%)

## 2018-10-09 NOTE — H&P ADULT - PROBLEM SELECTOR PLAN 4
-No longer on chemo- Cassadex -No longer on chemo- Cassadex  -Onc: Dr. Bowman -Plt ct of 18.3 on admission  -May be 2/2 myelodysplasia from recent chemotherapy vs sepsis  -No need for Plt transfusion   -Monitor platelet ct daily   -Fall precautions   -Heme/Onc: Dr. Bowman consulted -Plt ct of 18.3 on admission  -May be 2/2 myelodysplasia from recent chemotherapy vs sepsis  -No need for Plt transfusion   -Monitor platelet ct daily   -Pt may benefit from Neupogen but will await Onc recs  -Fall precautions   -Heme/Onc: Dr. Bowman consulted

## 2018-10-09 NOTE — H&P ADULT - PROBLEM SELECTOR PLAN 1
-Hypothermic to 94, tachy to 125, and hypotensive to 61/32  -CXR sig for patchy perihilar opacities- may be 2/2 PNA vs Mets   -Pt may also have urosepsis in the setting of prostate Ca  -s/p Azactam and Levaquin in the ED  -c/w -Hypothermic to 94, tachy to 125, and hypotensive to 61/32  -CXR sig for patchy perihilar opacities- may be 2/2 PNA vs Mets   -Pt may also have urosepsis in the setting of prostate Ca  -Lactate 5.5  -s/p 2L NS in ED- repeat lactate 5.1  -s/p Azactam and Levaquin in the ED  -c/w Meropenam q 8 empirically   -Monitor CBC and BMP  -f/u UA  -F/u Blood cultures and Ucx   -ID: Dr. Dudley -Hypothermic to 94, tachy to 125, and hypotensive to 61/32  -CXR sig for patchy perihilar opacities- may be 2/2 PNA vs Mets   -Pt may also have urosepsis in the setting of prostate Ca  -Lactate 5.5  -s/p 2L NS in ED- repeat lactate 5.1  -s/p Azactam and Levaquin in the ED  -c/w Meropenam q 8 empirically   -Monitor CBC and BMP  -f/u repeat lactate   -f/u UA  -F/u Blood cultures and Ucx   -ID: Dr. Dudley

## 2018-10-09 NOTE — ED PROVIDER NOTE - MEDICAL DECISION MAKING DETAILS
hypotensive, hypothermic, dehydrated, concern for infectious process, will get labs, give treatment, admission.  Pt is DNR/DNI

## 2018-10-09 NOTE — CONSULT NOTE ADULT - PROBLEM SELECTOR RECOMMENDATION 2
with bone, brain, liver mets. No longer on chemotherapy. Hospice eligible, pt/family have declined in the past. Pain regimen from NH records: tylenol, tramadol prn

## 2018-10-09 NOTE — H&P ADULT - ASSESSMENT
Pt is a 89 y M with PMH metastatic prostate cancer to brain/bone/liver, off casodex for several months, anemia, transferred from [facility] for evaluation of hypotension, abdominal pain.    In the ED, pt presents in no acute distress, vitals sig for , BP: 61/32, Tmax: 94.6, EKG A-fib.  Pt remains hypothermic to 94.6, and w/o leukocytosis.  Labs sig for Hb/Hct 5.5/18.3 w/ , K+ of 3.0, and Lactate of 5.3 Occult neg.      Pt will be admitted to [facility] for sepsis 2/2 UTI, severe anemia, and new AFib

## 2018-10-09 NOTE — ED PROVIDER NOTE - PMH
Anemia    Brain metastasis    Cataract of left eye    GERD (gastroesophageal reflux disease)    Prostate cancer metastatic to multiple sites

## 2018-10-09 NOTE — H&P ADULT - HISTORY OF PRESENT ILLNESS
Pt is a 89 y M with PMH metastatic prostate cancer to brain/bone/liver, off casodex for several months, anemia, transferred from [facility] for evaluation of hypotension, abdominal pain.  Pt describes pain as crampy/ sharp upper abd pain that has been constant for several hours without exacerbating, or relieving symptoms.  Pt denies N/V, and is having active bowel movements- last one several hours ago.  Pt also denies blood in stool or recent illness, other than the metastatic Ca.  Pt reports good appetite and is currently feeling hungry.  Pt is DNR/DNI per HCP, and would not like aggressive measures at this time including central lines or pressor support.  Pt denies CP, palpitations, SOB, cough HA, blurred vision, slurred speech, dizziness, fever, or syncope.     In the ED, pt presents in no acute distress, vitals sig for , BP: 61/32, Tmax: 94.6, EKG A-fib

## 2018-10-09 NOTE — CONSULT NOTE ADULT - SUBJECTIVE AND OBJECTIVE BOX
HPI: 89 y M with PMH metastatic prostate cancer to brain/bone/liver, off casodex for several months, anemia, transferred from [facility] for evaluation of hypotension, abdominal pain. In ED, noted to be hypotensive, Afib w RVR, labs significant for Hb 5.5, plts 23, elevated lactate.   Patient reports has been urinating, last BM yesterday. c/o diffuse abdominal pain x 1 d. Guaiac neg in ED.   Patient is DNR/DNI. Hospice discussed on previous admissions however patient/family declined.      PAST MEDICAL & SURGICAL HISTORY:  Brain metastasis  Anemia  Prostate cancer metastatic to multiple sites  Cataract of left eye  GERD (gastroesophageal reflux disease)  History of bilateral cataract extraction      SOCIAL HISTORY: niece Penelope Singer is HCP    Admitted from: [facility]      Surrogate/HCP/Guardian:   Penelope Singer         Phone#: [phone]    FAMILY HISTORY:  No pertinent family history in first degree relatives    Baseline ADLs (prior to admission): A&Ox3, dependent on ADLs    Allergies    penicillin (Rash)    Intolerances    Aspir 81 (Other)    Present Symptoms:   Dyspnea: denies  Nausea/Vomiting: denies  Anxiety: denies  Depressed: denies  Fatigue: +  Loss of appetite: denies  Pain:   diffuse abdominal pain 8/10       Review of Systems:  All others negative      MEDICATIONS  (STANDING):  digoxin  Injectable 0.5 milliGRAM(s) IV Push Once  ketorolac   Injectable 30 milliGRAM(s) IV Push once  potassium chloride    Tablet ER 40 milliEquivalent(s) Oral every 2 hours    MEDICATIONS  (PRN):      PHYSICAL EXAM:    Vital Signs Last 24 Hrs  T(C): 34.8 (09 Oct 2018 13:59), Max: 34.8 (09 Oct 2018 13:59)  T(F): 94.6 (09 Oct 2018 13:59), Max: 94.6 (09 Oct 2018 13:59)  HR: 125 (09 Oct 2018 13:52) (92 - 125)  BP: 81/50 (09 Oct 2018 14:34) (61/32 - 81/50)  BP(mean): --  RR: 18 (09 Oct 2018 13:52) (18 - 20)  SpO2: 97% (09 Oct 2018 13:52) (97% - 100%)     Karnofsky Performance Score/Palliative Performance Status Version2:  30 %     GENERAL: alert, weak, pale, ill appearing, no acute respiratory distress  HEENT: Atraumatic, pale conjunctiva, oropharynx clear, neck supple  CHEST/LUNG: unlabored  HEART: irregular   ABDOMEN: Soft, mild diffuse tenderness,  +distended  MUSCULOSKELETAL:  trace edema   NERVOUS SYSTEM:  Alert, answers appropriately, following commands, +nystagmus  SKIN: No rashes or lesions noted  Oral intake: npo       LABS:                        5.5    4.2   )-----------( 23       ( 09 Oct 2018 13:31 )             18.3     10-09    141  |  107  |  55<H>  ----------------------------<  94  3.0<L>   |  20<L>  |  1.14    Ca    8.4      09 Oct 2018 13:31  Mg     2.3     10-09    TPro  5.3<L>  /  Alb  2.4<L>  /  TBili  0.4  /  DBili  x   /  AST  53<H>  /  ALT  20  /  AlkPhos  248<H>  10-09        RADIOLOGY & ADDITIONAL STUDIES:    ADVANCE DIRECTIVES: HERNANDEZ, HCP

## 2018-10-09 NOTE — H&P ADULT - RS GEN PE MLT RESP DETAILS PC
airway patent/breath sounds equal/clear to auscultation bilaterally/respirations non-labored/good air movement

## 2018-10-09 NOTE — ED ADULT NURSE NOTE - NSIMPLEMENTINTERV_GEN_ALL_ED
Implemented All Fall with Harm Risk Interventions:  [place] to call system. Call bell, personal items and telephone within reach. Instruct patient to call for assistance. Room bathroom lighting operational. Non-slip footwear when patient is off stretcher. Physically safe environment: no spills, clutter or unnecessary equipment. Stretcher in lowest position, wheels locked, appropriate side rails in place. Provide visual cue, wrist band, yellow gown, etc. Monitor gait and stability. Monitor for mental status changes and reorient to person, place, and time. Review medications for side effects contributing to fall risk. Reinforce activity limits and safety measures with patient and family. Provide visual clues: red socks.

## 2018-10-09 NOTE — H&P ADULT - PROBLEM SELECTOR PLAN 3
-Hb/HCT 5.5/18.3 on admission  -pt will receive 2 units pRBC's -Hb/HCT 5.5/18.3 on admission  -pt will receive 2 units pRBC's  -Guiac neg  -f/u anemia panel  -f/u repeat CBC post transfusion in AM -Hb/HCT 5.5/18.3 on admission  -pt will receive 2 units pRBC's  -Guiac neg  -f/u anemia panel  -f/u repeat CBC post transfusion in AM  -Hold AC for now -May be 2/2 myelodysplasia from recent chemotherapy  -Hb/HCT 5.5/18.3 on admission  -pt will receive 2 units pRBC's  -Guiac neg  -f/u anemia panel  -f/u repeat CBC post transfusion in AM  -Hold AC for now -May be 2/2 myelodysplasia from recent chemotherapy  -Hb/HCT 5.5/18.3 on admission  -pt will receive 2 units pRBC's  -Guiac neg  -f/u anemia panel  -f/u repeat CBC post transfusion in AM  -Hold AC for now  -Pt may benefit from Neupogen but will await Onc recs  -Onc: Dr. Bowman

## 2018-10-09 NOTE — ED PROVIDER NOTE - CARE PLAN
Principal Discharge DX:	Hypotension  Secondary Diagnosis:	Hypothermia  Secondary Diagnosis:	Dehydration

## 2018-10-09 NOTE — H&P ADULT - PROBLEM SELECTOR PLAN 8
[] Previous VTE                                                3  [] Thrombophilia                                             2  [] Lower limb paralysis                                   2    [] Current Cancer                                             2   [] Immobilization > 24 hrs                              1  [] ICU/CCU stay > 24 hours                             1  [] Age > 60                                                         1    IMPROVE VTE Score: [] Previous VTE                                                3  [] Thrombophilia                                             2  [] Lower limb paralysis                                   2    [] Current Cancer                                             2   [] Immobilization > 24 hrs                              1  [] ICU/CCU stay > 24 hours                             1  [] Age > 60                                                         1    IMPROVE VTE Score: Hold AC due to risk of bleed -c/w PO feeds w/ ensure supplementation

## 2018-10-09 NOTE — H&P ADULT - PROBLEM SELECTOR PLAN 2
-Afib on admission - No hx of afib in the past  -/103's  -Started dig loading due to hypotension  -Hold AC to to severe anemia -Afib on admission - No hx of afib in the past  -/103's  -Started dig loading due to hypotension  -Hold AC to to severe anemia  -Monitor on Tele for now -Afib on admission - No hx of afib in the past  -/103's  -Started dig loading due to hypotension  -Hold AC to to severe anemia  -Monitor on Tele for now  -Cardio: Dr. Chung

## 2018-10-09 NOTE — ED PROVIDER NOTE - OBJECTIVE STATEMENT
89 y.o. [facility] male with h/o prostate ca with brain mets, BIBA c/o constant upper abd pain, no n/v, last BM yest., pt has been passing gas, appetite good, c/o feeling hungry, no dysuria,

## 2018-10-09 NOTE — CONSULT NOTE ADULT - PROBLEM SELECTOR RECOMMENDATION 5
Spoke w patient's niece Penelope Singer (niece/HCP) [phone] regarding current clinical condition and overall goals of care. She expressed that she wishes for pain control and to continue conservative medical measures including IVF, antibiotics and blood transfusions if necessary to help support him through his acute illness. She does not wish to consider hospice at this time. She will be coming in to the hospital to see him shortly. She wishes to see how he does w current treatment for now. He remains DNR/DNI, no central lines or pressors, no invasive procedures. Support provided. Palliative will follow

## 2018-10-09 NOTE — ED PROVIDER NOTE - PROGRESS NOTE DETAILS
pt is DNR/DNI spoke to pt's niece Penelope Singer [phone] no invasive procedure, agrees to blood transfusion.  Palliative care physician Dr. Mayer contacted case d/w Dr. Dominguez, asks to have pt admitted under him

## 2018-10-09 NOTE — CONSULT NOTE ADULT - PROBLEM SELECTOR RECOMMENDATION 9
with hypothermia, elev lactate, evidence of PNA. On IVF, antibiotics, f/u cultures.   Also w afib, digoxin given in ED. No central lines, pressors per family wishes.    Abd pain- pt reports last BM yesterday, urinating well. Further workup per primary team. Given IV acetaminophen, toradol for pain. Consider morphine 2 mg IVP every 4 hr as needed if no improvement and if BP improves. with hypothermia, elev lactate, evidence of PNA. On IVF, antibiotics, f/u cultures.   Also w afib, digoxin given in ED. No central lines, pressors per family wishes.    Abd pain- pt reports last BM yesterday, urinating well. Check UA, monitor for retention. Consider AXR. Further workup per primary team. Given IV acetaminophen, toradol for pain. Consider morphine 2 mg IVP every 4 hr as needed if no improvement and if BP improves. with hypothermia, elev lactate, evidence of PNA. On IVF, antibiotics, f/u cultures.   Also w afib, digoxin given in ED. No central lines, pressors per family wishes.    Abd pain- pt reports last BM yesterday, urinating well. Check UA, urine c/s, monitor for retention.  Further workup per primary team. Given IV acetaminophen, toradol for pain. Consider morphine 2 mg IVP every 4 hr as needed if no improvement and if BP improves.

## 2018-10-10 NOTE — DISCHARGE NOTE FOR THE EXPIRED PATIENT - HOSPITAL COURSE
89 y M with PMH metastatic prostate cancer to brain/bone/liver, off casodex for several months, anemia, transferred from [facility] for evaluation of hypotension, abdominal pain. Patient was admitted to [facility]. Patient was DNR/DNI/no aggressive measures, pain control only, hospice consult was placed. Hgb was 5.1. Patient received 2U PRBC. Oxygen saturation fluctuated, was put on nasal cannula and advanced to nonrebreather, continued to desaturate.    I received a page to assess patient for expiration. Breath sounds were absent bilaterally. Heart sounds absent. Patient pronounced dead on 10/10/2018 at 6:24am by cardiopulmonary arrest. 89 y M with PMH metastatic prostate cancer to brain/bone/liver, off casodex for several months, anemia, transferred from [facility] for evaluation of hypotension, abdominal pain. Patient was admitted to [facility]. Patient was DNR/DNI/no aggressive measures, pain control only, hospice consult was placed. Hgb was 5.1. Patient received 2U PRBC. Oxygen saturation fluctuated, was put on nasal cannula and advanced to nonrebreather, continued to desaturate.    I received a page to assess patient for expiration as telemetry showed no blood pressure or pulse. Breath sounds were absent bilaterally. Heart sounds absent. Patient pronounced dead on 10/10/2018 at 6:24am by cardiopulmonary arrest with primary cause of death as metastatic prostate cancer. 89 y M with PMH metastatic prostate cancer to brain/bone/liver, off casodex for several months, anemia, transferred from [facility] for evaluation of hypotension, abdominal pain. Patient was admitted to [facility]. Patient was DNR/DNI/no aggressive measures, pain control only, hospice consult was placed. Hgb was 5.1. Patient received 2U PRBC. Oxygen saturation fluctuated, was put on nasal cannula and advanced to nonrebreather, continued to desaturate.    I received a page to assess patient for expiration as telemetry showed no blood pressure or pulse. Breath sounds were absent bilaterally. Heart sounds absent. Patient pronounced dead on 10/10/2018 at 6:24am by cardiopulmonary arrest with primary cause of death as metastatic prostate cancer. No autopsy.

## 2018-10-10 NOTE — ED ADULT NURSE REASSESSMENT NOTE - NS ED NURSE REASSESS COMMENT FT1
Dr. Waggoner notified of Pt. oxygen saturation of 40%. Pt. placed on non-rebreather mask. Pt. sating at 76%. Pt. is DNR/DNI
Pt. cleaned and changed. Pt. remains on non-rebreather saturation varying between 60 and 98%. Dr. Palafox is aware. Will continue to monitor closlely
Pt. oxygen saturation continue to drop Dr. Waggoner and Dr. Palafox is aware. Around 0624 pt was unresponsive and . Dr. Palafox was notified and made aware.
Pt. oxygen saturation fluctuates from 95 to 66. Pt. is DNR/DNI and is on 4L nasal canula. Dr. Palafox is aware.
Received pt with first blood transfusion in progress, no adverse effects noted. Pt. c/o pain and received Tylenol IVPB with little effect. Second unit of blood in progress at this time. Pt. is hypothermic. Bear hugger placed on pt. Dr. Palafox is aware aqnd aware of pt. VS. Will continue to monitor closely.
Dr Valente waldrop notified pt in urinary retention. Bladder sono noted 589 ml urine in bladder post void with scant urine output noted. pt complaining of abdominal pain. 22fr coude cathether inserted by Dr Fontana, some urine expelled during insertion with 400ml clear yellow noted in return.

## 2018-10-14 LAB
CULTURE RESULTS: SIGNIFICANT CHANGE UP
CULTURE RESULTS: SIGNIFICANT CHANGE UP
SPECIMEN SOURCE: SIGNIFICANT CHANGE UP
SPECIMEN SOURCE: SIGNIFICANT CHANGE UP

## 2018-11-27 PROCEDURE — 86901 BLOOD TYPING SEROLOGIC RH(D): CPT

## 2018-11-27 PROCEDURE — 82728 ASSAY OF FERRITIN: CPT

## 2018-11-27 PROCEDURE — 82962 GLUCOSE BLOOD TEST: CPT

## 2018-11-27 PROCEDURE — 84443 ASSAY THYROID STIM HORMONE: CPT

## 2018-11-27 PROCEDURE — P9040: CPT

## 2018-11-27 PROCEDURE — 82009 KETONE BODYS QUAL: CPT

## 2018-11-27 PROCEDURE — 86923 COMPATIBILITY TEST ELECTRIC: CPT

## 2018-11-27 PROCEDURE — 87040 BLOOD CULTURE FOR BACTERIA: CPT

## 2018-11-27 PROCEDURE — 83615 LACTATE (LD) (LDH) ENZYME: CPT

## 2018-11-27 PROCEDURE — 83735 ASSAY OF MAGNESIUM: CPT

## 2018-11-27 PROCEDURE — 84100 ASSAY OF PHOSPHORUS: CPT

## 2018-11-27 PROCEDURE — 83605 ASSAY OF LACTIC ACID: CPT

## 2018-11-27 PROCEDURE — 83550 IRON BINDING TEST: CPT

## 2018-11-27 PROCEDURE — 82607 VITAMIN B-12: CPT

## 2018-11-27 PROCEDURE — 81003 URINALYSIS AUTO W/O SCOPE: CPT

## 2018-11-27 PROCEDURE — 86850 RBC ANTIBODY SCREEN: CPT

## 2018-11-27 PROCEDURE — 83880 ASSAY OF NATRIURETIC PEPTIDE: CPT

## 2018-11-27 PROCEDURE — 86900 BLOOD TYPING SEROLOGIC ABO: CPT

## 2018-11-27 PROCEDURE — 96375 TX/PRO/DX INJ NEW DRUG ADDON: CPT

## 2018-11-27 PROCEDURE — 71045 X-RAY EXAM CHEST 1 VIEW: CPT

## 2018-11-27 PROCEDURE — 84466 ASSAY OF TRANSFERRIN: CPT

## 2018-11-27 PROCEDURE — 85045 AUTOMATED RETICULOCYTE COUNT: CPT

## 2018-11-27 PROCEDURE — 83010 ASSAY OF HAPTOGLOBIN QUANT: CPT

## 2018-11-27 PROCEDURE — 85610 PROTHROMBIN TIME: CPT

## 2018-11-27 PROCEDURE — 93005 ELECTROCARDIOGRAM TRACING: CPT

## 2018-11-27 PROCEDURE — 82272 OCCULT BLD FECES 1-3 TESTS: CPT

## 2018-11-27 PROCEDURE — 80053 COMPREHEN METABOLIC PANEL: CPT

## 2018-11-27 PROCEDURE — 96374 THER/PROPH/DIAG INJ IV PUSH: CPT

## 2018-11-27 PROCEDURE — 85730 THROMBOPLASTIN TIME PARTIAL: CPT

## 2018-11-27 PROCEDURE — 36430 TRANSFUSION BLD/BLD COMPNT: CPT

## 2018-11-27 PROCEDURE — 80061 LIPID PANEL: CPT

## 2018-11-27 PROCEDURE — 85027 COMPLETE CBC AUTOMATED: CPT

## 2018-11-27 PROCEDURE — 87086 URINE CULTURE/COLONY COUNT: CPT

## 2018-11-27 PROCEDURE — 99291 CRITICAL CARE FIRST HOUR: CPT | Mod: 25

## 2018-11-27 PROCEDURE — 83036 HEMOGLOBIN GLYCOSYLATED A1C: CPT

## 2019-02-05 NOTE — DISCHARGE NOTE ADULT - FUNCTIONAL SCREEN CURRENT LEVEL: BATHING, MLM
Rm 1 
Patient presents to the clinic with mother who is speaking for him stating needing oxygen tank, shower chair, wheel chair due to SOB and something for pain. Patient stated  came in two weeks ago to check on patient. Depression Screening: PHQ over the last two weeks 2/5/2019 12/4/2018 Little interest or pleasure in doing things Not at all Several days Feeling down, depressed, irritable, or hopeless Not at all Several days Total Score PHQ 2 0 2 Learning Assessment: 
Learning Assessment 12/4/2018 PRIMARY LEARNER Patient HIGHEST LEVEL OF EDUCATION - PRIMARY LEARNER  DID NOT GRADUATE HIGH SCHOOL  
BARRIERS PRIMARY LEARNER NONE  
CO-LEARNER CAREGIVER No  
PRIMARY LANGUAGE ENGLISH  
LEARNER PREFERENCE PRIMARY DEMONSTRATION  
ANSWERED BY patient RELATIONSHIP SELF Abuse Screening: No flowsheet data found. Health Maintenance reviewed and discussed per provider: yes Coordination of Care: 1. Have you been to the ER, urgent care clinic since your last visit? Hospitalized since your last visit? no 
 
2. Have you seen or consulted any other health care providers outside of the [address] [place] [address] since your last visit? Include any pap smears or colon screening.  No 
 
 
 
 (2) assistive person

## 2019-04-13 NOTE — CONSULT NOTE ADULT - PROBLEM/RECOMMENDATION-2
Lower Extremity/Ankle HPI





- HPI Summary


HPI Summary: 





21 yo female presents with LEFT ankle injury. She tells me that earlier today 

she was playing in her softball game and a line drive hit her in the outside of 

her left ankle. Since that time she has been having pain and unable to walk due 

to pain. The  wrapped it in an ACE wrap and applied ice and gave her 

crutches. Pt has been using crutches since the injury. 





- History of Current Complaint


Stated Complaint: LEFT ANKLE/FOOT INJURY


Time Seen by Provider: 04/13/19 19:46


Hx Obtained From: Patient


Onset/Duration: Sudden Onset


Severity Initially: Moderate


Severity Currently: Moderate


Pain Intensity: 6


Pain Scale Used: 0-10 Numeric


Alleviating Factor(s): Rest, Elevation





- Allergies/Home Medications


Allergies/Adverse Reactions: 


 Allergies











Allergy/AdvReac Type Severity Reaction Status Date / Time


 


No Known Allergies Allergy   Verified 04/13/19 19:55











Home Medications: 


 Home Medications





Ibuprofen TAB* [Motrin TAB* 800 MG] 800 mg PO ONCE 04/13/19 [History Confirmed 

04/13/19]


l-Norgest/E.estradiol-E.estrad [Seasonique 0.15-0.03-0.01 Tab] 1 each PO DAILY 

04/13/19 [History Confirmed 04/13/19]











PMH/Surg Hx/FS Hx/Imm Hx





- Additional Past Medical History


Additional PMH: 





None





Review of Systems


All Other Systems Reviewed And Are Negative: Yes


Constitutional: Positive: Negative


Skin: Positive: Negative


Respiratory: Positive: Negative


Cardiovascular: Positive: Negative


Neurovascular: Positive: Negative


Musculoskeletal: Positive: Other: - Left ankle pain


Neurological: Positive: Negative


Psychological: Positive: Negative





Physical Exam





- Summary


Physical Exam Summary: 





GENERAL: NAD. WDWN. No pain distress.


SKIN: No rashes, sores, lesions, or open wounds.


CHEST:  No accessory muscle use. Breathing comfortably and in no distress.


CV:  Pulses intact PT and DP. Cap refill <2seconds


MSK: LEFT ANKLE: Mild TTP overlying lateral malleolus, inferior and posterior 

lateral malleolus, and left 5th MTP pain. FROM. Strength 5/5. No edema or 

obvious bony deformities. Negative talar tilt. No increased laxity.


NEURO: Alert. Sensations intact and symmetric B/L LEs


PSYCH: Age appropriate behavior.


Triage Information Reviewed: Yes


Vital Signs: 





Vital Signs:











Temp Pulse Resp BP Pulse Ox


 


 98.3 F   66   16   123/57   100 


 


 04/13/19 19:51  04/13/19 19:51  04/13/19 19:51  04/13/19 19:51  04/13/19 19:51











Vital Signs Reviewed: Yes





Lower Extremity Course/Dx





- Course


Course Of Treatment: 





XR: No radiologist reading after 1800, therefore wet read by myself is negative 

for fracture.





I suspect her pain is due to the impact of the softball and advised to treat as 

a contusion.





A gel splint and ACE wrap were applied. Advised to RICE and use crutches. F/u 

if symptoms do not improve in a few days.





- Differential Dx/Diagnosis


Provider Diagnosis: 


 Contusion of left ankle








Discharge





- Sign-Out/Discharge


Documenting (check all that apply): Patient Departure


All imaging exams completed and their final reports reviewed: No





- Discharge Plan


Condition: Stable


Disposition: HOME


Patient Education Materials:  Contusion in Adults (ED)


Referrals: 


Sabrina Eldridge NP [Primary Care Provider] - 


Additional Instructions: 


If you develop a fever, shortness of breath, chest pain, new or worsening 

symptoms - please call your PCP or go to the ED.


 


1) Rest, Ice, and elevate your ankle/foot as much as possible


2) Use the ACE wrap and gel splint for support and comfort


3) Use the crutches to be non-weight bearing until you are feeling better


4) If your symptoms do not improve in 5-7 days, please be rechecked





- Billing Disposition and Condition


Condition: STABLE


Disposition: Home DISPLAY PLAN FREE TEXT

## 2019-10-07 NOTE — PATIENT PROFILE ADULT. - NS PRO AD PATIENT TYPE
[de-identified] : Physical exam demonstrates the patient to be alert and oriented x 3 and capable of ambulation. The patient is well-developed and well-nourished in no apparent respiratory distress. The majority of the skin is intact bilaterally in the upper extremities without any bilateral elbow lymphadenopathy. \par \par There is good capillary refill of the digits bilaterally. There are no masses palpated or sensitivity over the median and ulnar nerves at the level of the wrist. There is a negative Tinel's and negative Phalen's and a negative carpal tunnel compression test bilaterally. Sensation is intact to light touch bilaterally.\par \par Evaluation of right wrist reveals point tenderness over her distal radius and distal ulna. Nontender over the scaphoid or anatomic snuffbox. No significant bruising or ecchymosis.\par \par Evaluation of left small finger reveals normal rotation. No significant bruising or ecchymosis. Tender over middle phalanx base. Full range of motion [de-identified] : PA lateral oblique x-rays of the right wrist from urgent care and was normal. His lateral oblique of left small finger 5 days ago was normal.\par \par PA lateral oblique of both wrists as well as PA lateral oblique of left small finger unremarkable Do Not Resuscitate (DNR)

## 2020-06-12 NOTE — ED ADULT NURSE NOTE - ED STAT RN HANDOFF WHERE 3
What Type Of Note Output Would You Prefer (Optional)?: Bullet Format How Severe Are Your Spot(S)?: mild Hpi Title: Evaluation of Skin Lesions ED

## 2020-12-21 NOTE — ED ADULT NURSE NOTE - CCCP TRG CHIEF CMPLNT
PATIENT HAS BEEN SCREENED AND CATEGORIZED AS LOW NUTRITION RISK. PATIENT WILL BE SEEN WITHIN 
7 DAYS OF ADMISSION.



12/26/20



FRANK CARRILLO RD
generalized body pain

## 2021-04-11 NOTE — PHYSICAL THERAPY INITIAL EVALUATION ADULT - GENERAL OBSERVATIONS, REHAB EVAL
89 y M aox3, rehab transfers, recent prbc, gen weakness, tolerating assisted oob and short distant rw amb bedside Opt out

## 2021-07-26 NOTE — DISCHARGE NOTE ADULT - VISION (WITH CORRECTIVE LENSES IF THE PATIENT USUALLY WEARS THEM):
Normal vision: sees adequately in most situations; can see medication labels, newsprint
abdominal pain

## 2021-12-09 NOTE — PROGRESS NOTE ADULT - PROBLEM/PLAN-3
[de-identified] : The underlying pathophysiology was reviewed in great detail with the patient as well as the various treatment options, including ice, analgesics, NSAIDs, Physical therapy, steroid injections.\par \par He may d/c use of CAM walker at this time. Discussed use of lace up ankle brace for support and stabilization while playing basketball. \par \par Activity modifications and restrictions were discussed. Discussed the importance of proprioception. \par \par A home exercise sheet was given and discussed with the patient to follow.\par \par A letter was provided for school today. \par \par FU 4 weeks \par \par All questions were answered, all alternatives discussed and the patient is in complete agreement with that plan. Follow-up appointment as instructed. Any issues and the patient will call or come in sooner. 
DISPLAY PLAN FREE TEXT

## 2022-12-01 NOTE — ED PROVIDER NOTE - CONSTITUTIONAL DISTRESS
MODERATE Alert-The patient is alert, awake and responds to voice. The patient is oriented to time, place, and person. The triage nurse is able to obtain subjective information.

## 2024-01-04 NOTE — ED PROVIDER NOTE - OBJECTIVE STATEMENT
89yo m with prostate CA and anemia requiring transfusions presents with generalized body aches. Reports onset this morning, feels like all the bones in his body hurt. Reports decreased po intake in last few days, denies vomiting/abdominal pain. Reports diarrhea 3 days ago. Denies chest pain, cough, fevers, dysuria. Reports last blood transfusion 1.5wks ago. 0 = swallows foods/liquids without difficulty

## 2024-03-14 NOTE — DISCHARGE NOTE ADULT - MEDICATION SUMMARY - MEDICATIONS TO CHANGE
NAUN Santacruz, NP
I will SWITCH the dose or number of times a day I take the medications listed below when I get home from the hospital:  None

## 2024-03-19 NOTE — CONSULT NOTE ADULT - PROBLEM SELECTOR PROBLEM 1
Medication Management Clinic  [facility]  Anticoagulation Clinic  [phone] (phone)  [phone] (fax)    Mr. Benny Benson Jr is a 68 y.o.  male with history of Afib who presents today for anticoagulation monitoring and adjustment.    Patient verifies current dosing regimen and tablet strength.  No missed or extra doses.  Patient denies s/s bleeding/bruising/swelling/SOB  No blood in urine or stool.  No dietary changes.  No changes in medication/OTC agents/Herbals.  No change in alcohol use or tobacco use.  [place]  [place].  No vomiting/diarrhea or acute illness.   No Procedures scheduled in the future at this time.    Assessment:   Lab Results   Component Value Date    INR 2.60 (H) 03/19/2024    INR 2.20 (H) 02/20/2024    INR 2.50 (H) 02/05/2024     INR therapeutic   Recent Labs     03/19/24  0731   INR 2.60*         Plan:  Continue Coumadin 1.5mg MWF and 3mg TThSaS.  Recheck INR in 4 week(s).  Patient reminded to call the Anticoagulation Clinic with any signs or symptoms of bleeding or with any medication changes.  Patient given instructions utilizing the teach back method.        After visit summary printed and reviewed with patient.      Discharged ambulatory in no apparent distress.    For Pharmacy Admin Tracking Only    Time Spent (min): 20     Prostate cancer metastatic to multiple sites

## 2024-04-09 NOTE — ED PROVIDER NOTE - PROGRESS NOTE DETAILS
Called patient reference to a referral to  Ambulatory Colorectal Surgery for colon cancer screening, left voice message.  
Pt feeling better. Able to ambulate w/ cane w/ no difficulties. Will dc home w/ pt's nephew.

## 2024-09-23 NOTE — PATIENT PROFILE ADULT. - SOURCE OF INFORMATION, PROFILE
Syed Wynn is here today for: 10-2 VF     The test was performed with good compliance.    Diagnosis: Z79.899 Plaquenil use, M06.9 Rheumatoid arthritis     Patient is using:   Plaquenil     Patient phone number: [phone]    Pending appointment: today    Gavi Looney 9/23/2024     chart(s)/patient

## 2025-01-21 NOTE — H&P ADULT - PROBLEM SELECTOR PROBLEM 2
[TextEntry] : 74-year-old male with bilateral lower extremity dependent edema.  Symptoms improved with exercise, compression stockings, leg elevation.  Bilateral ultrasound today does not show any venous insufficiency.  Recommend continued compression stockings and leg elevation.  No vascular intervention indicated. 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

## 2025-07-29 NOTE — ED ADULT NURSE NOTE - ED STAT RN HANDOFF DETAILS 2
Fax [phone]    Dr Alex[facility] .  Orders fax   pt is alert awake oriented x3 no acute distress noted on telemetry monitoring with regular rhythm pt on PRBC transfusion at this time no adverse reaction noted barcenas drains by gravity with yellow urine endorsed to Leslie BATEMAN in ED